# Patient Record
Sex: FEMALE | Race: WHITE | Employment: UNEMPLOYED | URBAN - METROPOLITAN AREA
[De-identification: names, ages, dates, MRNs, and addresses within clinical notes are randomized per-mention and may not be internally consistent; named-entity substitution may affect disease eponyms.]

---

## 2017-10-06 ENCOUNTER — APPOINTMENT (OUTPATIENT)
Dept: GENERAL RADIOLOGY | Age: 52
End: 2017-10-06
Attending: PHYSICIAN ASSISTANT
Payer: COMMERCIAL

## 2017-10-06 ENCOUNTER — HOSPITAL ENCOUNTER (EMERGENCY)
Age: 52
Discharge: HOME | End: 2017-10-06
Attending: EMERGENCY MEDICINE
Payer: COMMERCIAL

## 2017-10-06 VITALS
WEIGHT: 233 LBS | BODY MASS INDEX: 38.82 KG/M2 | RESPIRATION RATE: 16 BRPM | TEMPERATURE: 98.6 F | HEIGHT: 65 IN | OXYGEN SATURATION: 98 % | SYSTOLIC BLOOD PRESSURE: 153 MMHG | DIASTOLIC BLOOD PRESSURE: 81 MMHG | HEART RATE: 62 BPM

## 2017-10-06 DIAGNOSIS — M25.562 ACUTE PAIN OF LEFT KNEE: Primary | ICD-10-CM

## 2017-10-06 PROCEDURE — 73562 X-RAY EXAM OF KNEE 3: CPT

## 2017-10-06 PROCEDURE — 99282 EMERGENCY DEPT VISIT SF MDM: CPT

## 2017-10-06 RX ORDER — CYCLOBENZAPRINE HCL 10 MG
10 TABLET ORAL
Qty: 15 TAB | Refills: 0 | Status: SHIPPED | OUTPATIENT
Start: 2017-10-06 | End: 2022-05-23

## 2017-10-06 NOTE — Clinical Note
Continue to use compression therapy with ambulating. Follow-up with regular doctor. You can apply icy-hot or capsacian cream to the area per package instructions. Return for any new or worsening.  Trudy Josue [place]

## 2017-10-06 NOTE — ED NOTES
Pt reports injuring her knee and being seen in the ER on labor day. Went to [facility] 2 [facility] later, received steroid injection and rx for steroids. Unable to follow up with ortho d/t insurance. Taking Aleve without relief.

## 2017-10-06 NOTE — ED TRIAGE NOTES
Patient reports on labor day she fell injuring her left knee. She went to [facility] 3 weeks ago and had injection of cortisone. Patient states pain never got better and know radiates to lower left leg. Swelling comes and goes.

## 2017-10-07 NOTE — DISCHARGE INSTRUCTIONS
We hope that we have addressed all of your medical concerns. The examination and treatment you received in the Emergency Department were for an emergent problem and were not intended as complete care. It is important that you follow up with your healthcare provider(s) for ongoing care. If your symptoms worsen or do not improve as expected, and you are unable to reach your usual health care provider(s), you should return to the Emergency Department. Today's healthcare is undergoing tremendous change, and patient satisfaction surveys are one of the many tools to assess the quality of medical care. You may receive a survey from the [employer] regarding your experience in the Emergency Department. I hope that your experience has been completely positive, particularly the medical care that I provided. As such, please participate in the survey; anything less than excellent does not meet my expectations or intentions. [facility] and [address] [place] [address] participate in nationally recognized quality of care measures. If your blood pressure is greater than 120/80, as reported below, we urge that you seek medical care to address the potential of high blood pressure, commonly known as hypertension. Hypertension can be hereditary or can be caused by certain medical conditions, pain, stress, or \"white coat syndrome. \"       Please make an appointment with your health care provider(s) for follow up of your Emergency Department visit. VITALS:   Patient Vitals for the past 8 hrs:   Temp Pulse Resp SpO2   10/06/17 1854 98.1 °F (36.7 °C) 69 18 98 %          Thank you for allowing us to provide you with medical care today. We realize that you have many choices for your emergency care needs. Please choose us in the future for any continued health care needs. Regards,           April ROSANA.  Juliano, [address] [place] [address].   Office: [phone]            No results found for this or any previous visit (from the past 24 hour(s)). Xr Knee Lt 3 V    Result Date: 10/6/2017  EXAM:  XR KNEE LT 3 V INDICATION:   knee pain. COMPARISON: None. FINDINGS: Three views of the left knee demonstrate no fracture or other acute osseous or articular abnormality. There is no effusion. IMPRESSION:  No acute abnormality. Knee Pain or Injury: Care Instructions  Your Care Instructions    Injuries are a common cause of knee problems. Sudden (acute) injuries may be caused by a direct blow to the knee. They can also be caused by abnormal twisting, bending, or falling on the knee. Pain, bruising, or swelling may be severe, and may start within minutes of the injury. Overuse is another cause of knee pain. Other causes are climbing stairs, kneeling, and other activities that use the knee. Everyday wear and tear, especially as you get older, also can cause knee pain. Rest, along with home treatment, often relieves pain and allows your knee to heal. If you have a serious knee injury, you may need tests and treatment. Follow-up care is a key part of your treatment and safety. Be sure to make and go to all appointments, and call your doctor if you are having problems. It's also a good idea to know your test results and keep a list of the medicines you take. How can you care for yourself at home? · Be safe with medicines. Read and follow all instructions on the label. ¨ If the doctor gave you a prescription medicine for pain, take it as prescribed. ¨ If you are not taking a prescription pain medicine, ask your doctor if you can take an over-the-counter medicine. · Rest and protect your knee. Take a break from any activity that may cause pain. · Put ice or a cold pack on your knee for 10 to 20 minutes at a time. Put a thin cloth between the ice and your skin.   · Prop up a sore knee on a pillow when you ice it or anytime you sit or lie down for the next 3 days. Try to keep it above the level of your heart. This will help reduce swelling. · If your knee is not swollen, you can put moist heat, a heating pad, or a warm cloth on your knee. · If your doctor recommends an elastic bandage, sleeve, or other type of support for your knee, wear it as directed. · Follow your doctor's instructions about how much weight you can put on your leg. Use a cane, crutches, or a walker as instructed. · Follow your doctor's instructions about activity during your healing process. If you can do mild exercise, slowly increase your activity. · Reach and stay at a healthy weight. Extra weight can strain the joints, especially the knees and hips, and make the pain worse. Losing even a few pounds may help. When should you call for help? Call 911 anytime you think you may need emergency care. For example, call if:  · You have symptoms of a blood clot in your lung (called a pulmonary embolism). These may include:  ¨ Sudden chest pain. ¨ Trouble breathing. ¨ Coughing up blood. Call your doctor now or seek immediate medical care if:  · You have severe or increasing pain. · Your leg or foot turns cold or changes color. · You cannot stand or put weight on your knee. · Your knee looks twisted or bent out of shape. · You cannot move your knee. · You have signs of infection, such as:  ¨ Increased pain, swelling, warmth, or redness. ¨ Red streaks leading from the knee. ¨ Pus draining from a place on your knee. ¨ A fever. · You have signs of a blood clot in your leg (called a deep vein thrombosis), such as:  ¨ Pain in your calf, back of the knee, thigh, or groin. ¨ Redness and swelling in your leg or groin. Watch closely for changes in your health, and be sure to contact your doctor if:  · You have tingling, weakness, or numbness in your knee. · You have any new symptoms, such as swelling. · You have bruises from a knee injury that last longer than 2 weeks.   · You do not get better as expected. Where can you learn more? Go to http://casey-claudy.info/. Enter K195 in the search box to learn more about \"Knee Pain or Injury: Care Instructions. \"  Current as of: March 20, 2017  Content Version: 11.3  © 3690-9077 [employer], [employer] Care instructions adapted under license by [employer] (which disclaims liability or warranty for this information). If you have questions about a medical condition or this instruction, always ask your healthcare professional. [address] any warranty or liability for your use of this information.

## 2017-10-07 NOTE — ED PROVIDER NOTES
HPI Comments: 78-year-old  female with medical history significant for hypertension presenting to the emergency department with complaint of a cystic history of left anterior knee pain described as aching, with ambulating and improved compression and rest after a fall up the stairs and landing directly onto the patella. She reports being seen at an [facility] ED immediately following the fall. She she states she was diagnosed with arthritis and referred to an orthopedist. She has subsequently followed up with [facility]o on call and received cortisone injections to the lateral knee. She reports minimal improvement of pain following injection. She reports attempting to followup with orthopedist but secondary to financial constraints has been unable to comply. She reports taking diclofenac twice daily for the past week without significant improvement. She reports intolerance to medication and subsequently stopped taking it 2 days ago. She denies any associated numbness or tingling into the lower extremity. Is able to exercise without difficulty. Has also tried icing a heating the knee. 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 Has otherwise felt well without associated fever, chills, headaches, chest pain, shortness of breath no abdominal pain, nausea, vomiting, diarrhea, constipation, dysuria or frequency, urgency or hematuria. Patient is a 45 y.o. female presenting with knee pain. The history is provided by the patient. Knee Pain    Pertinent negatives include no numbness, no back pain and no neck pain. Past Medical History:   Diagnosis Date    Hypertension        Past Surgical History:   Procedure Laterality Date    HX APPENDECTOMY      HX TUBAL LIGATION           No family history on file.     Social History     Social History    Marital status:      Spouse name: N/A    Number of children: N/A    Years of education: N/A     Occupational History  Not on file. Social History Main Topics    Smoking status: Former Smoker    Smokeless tobacco: Not on file    Alcohol use Yes      Comment: Socially    Drug use: Not on file    Sexual activity: Not on file     Other Topics Concern    Not on file     Social History Narrative         ALLERGIES: Erythromycin; Ibuprofen; and Sulfa (sulfonamide antibiotics)    Review of Systems   Constitutional: Negative. Negative for chills, fatigue and fever. HENT: Negative. Negative for congestion, ear pain, facial swelling, rhinorrhea, sneezing and sore throat. Eyes: Negative for pain, discharge and itching. Respiratory: Negative for cough, chest tightness and shortness of breath. Cardiovascular: Negative. Negative for chest pain and leg swelling. Gastrointestinal: Negative. Negative for abdominal distention, abdominal pain, constipation, diarrhea, nausea and vomiting. Genitourinary: Negative for difficulty urinating, frequency and urgency. Musculoskeletal: Positive for arthralgias (left anterior knee). Negative for back pain, joint swelling, neck pain and neck stiffness. Skin: Negative for color change and rash. Neurological: Negative for dizziness, numbness and headaches. Psychiatric/Behavioral: Negative for confusion and decreased concentration. All other systems reviewed and are negative. Vitals:    10/06/17 1854 10/06/17 2025   BP:  153/81   Pulse: 69 62   Resp: 18 16   Temp: 98.1 °F (36.7 °C) 98.6 °F (37 °C)   SpO2: 98% 98%   Weight: 105.7 kg (233 lb)    Height: 5' 5\" (1.651 m)             Physical Exam   Constitutional: She is oriented to person, place, and time. She appears well-developed and well-nourished. No distress. Well appearing  female in NAD   HENT:   Head: Normocephalic and atraumatic. Right Ear: External ear normal.   Left Ear: External ear normal.   Nose: Nose normal.   Mouth/Throat: Oropharynx is clear and moist. No oropharyngeal exudate.    Eyes: Conjunctivae and EOM are normal. Pupils are equal, round, and reactive to light. Right eye exhibits no discharge. Left eye exhibits no discharge. No scleral icterus. Neck: Normal range of motion. Neck supple. Cardiovascular: Normal rate and regular rhythm. Exam reveals no gallop and no friction rub. No murmur heard. Pulmonary/Chest: Effort normal and breath sounds normal. She has no wheezes. She has no rales. Abdominal: Soft. Bowel sounds are normal. She exhibits no distension. There is no tenderness. There is no rebound and no guarding. Musculoskeletal:        Left knee: She exhibits normal range of motion, no swelling, no effusion, no ecchymosis, no deformity, no laceration, no erythema and normal alignment. Tenderness found. Lateral joint line tenderness noted. Neurological: She is alert and oriented to person, place, and time. No cranial nerve deficit. Coordination normal.   Skin: Skin is warm and dry. She is not diaphoretic. Psychiatric: She has a normal mood and affect. Her behavior is normal.   Nursing note and vitals reviewed. MDM  Number of Diagnoses or Management Options  Acute pain of left knee:   Diagnosis management comments: 44 yo  female with complaint of a 6 week hx of left lateral knee pain following direct trauma to the area. Exam is non-traumatic in appearance without significant abnormality appreciated. ? Ligamental or meniscus injury  Plan  Xray left knee. Lee aLm         Amount and/or Complexity of Data Reviewed  Tests in the radiology section of CPT®: ordered and reviewed  Independent visualization of images, tracings, or specimens: yes      ED Course       Procedures    Progress note    Imaging unremarkable. Justin Lam[address] [place]    Pt has compression wrap. Declining stronger analgesia. Prefers to try topical analgesia. Trudy Henao, [address] [place]    Patient's results have been reviewed with them.   Patient and/or family have verbally conveyed their understanding and agreement of the patient's signs, symptoms, diagnosis, treatment and prognosis and additionally agree to follow up as recommended or return to the Emergency Room should their condition change prior to follow-up. Discharge instructions have also been provided to the patient with some educational information regarding their diagnosis as well a list of reasons why they would want to return to the ER prior to their follow-up appointment should their condition change. Trudy WAYNE [facility], [address]    A/P  Left knee pain: Continue to use compression therapy with ambulating. Follow-up with regular doctor. You can apply icy-hot or capsacian cream to the area per package instructions. Return for any new or worsening.  Trudy WAYNE [facility], [address]

## 2018-08-29 ENCOUNTER — HOSPITAL ENCOUNTER (EMERGENCY)
Age: 53
Discharge: HOME | End: 2018-08-29
Attending: EMERGENCY MEDICINE
Payer: COMMERCIAL

## 2018-08-29 VITALS
WEIGHT: 242 LBS | OXYGEN SATURATION: 98 % | SYSTOLIC BLOOD PRESSURE: 146 MMHG | BODY MASS INDEX: 38.89 KG/M2 | RESPIRATION RATE: 16 BRPM | HEART RATE: 73 BPM | DIASTOLIC BLOOD PRESSURE: 89 MMHG | TEMPERATURE: 98.6 F | HEIGHT: 66 IN

## 2018-08-29 DIAGNOSIS — L02.415 ABSCESS OF LEG, RIGHT: Primary | ICD-10-CM

## 2018-08-29 PROCEDURE — 99283 EMERGENCY DEPT VISIT LOW MDM: CPT

## 2018-08-29 PROCEDURE — 74011000250 HC RX REV CODE- 250: Performed by: NURSE PRACTITIONER

## 2018-08-29 PROCEDURE — 74011250636 HC RX REV CODE- 250/636: Performed by: NURSE PRACTITIONER

## 2018-08-29 PROCEDURE — 75810000289 HC I&D ABSCESS SIMP/COMP/MULT

## 2018-08-29 PROCEDURE — 74011250637 HC RX REV CODE- 250/637: Performed by: PHYSICIAN ASSISTANT

## 2018-08-29 RX ORDER — ACETAMINOPHEN 500 MG
1000 TABLET ORAL
Status: COMPLETED | OUTPATIENT
Start: 2018-08-29 | End: 2018-08-29

## 2018-08-29 RX ORDER — LIDOCAINE HYDROCHLORIDE 10 MG/ML
5 INJECTION INFILTRATION; PERINEURAL ONCE
Status: COMPLETED | OUTPATIENT
Start: 2018-08-29 | End: 2018-08-29

## 2018-08-29 RX ORDER — CEPHALEXIN 500 MG/1
500 CAPSULE ORAL 3 TIMES DAILY
Qty: 30 CAP | Refills: 0 | Status: SHIPPED | OUTPATIENT
Start: 2018-08-29 | End: 2018-09-08

## 2018-08-29 RX ORDER — CEPHALEXIN 500 MG/1
500 CAPSULE ORAL
Status: COMPLETED | OUTPATIENT
Start: 2018-08-29 | End: 2018-08-29

## 2018-08-29 RX ADMIN — Medication 2 ML: at 18:46

## 2018-08-29 RX ADMIN — LIDOCAINE HYDROCHLORIDE 5 ML: 10 INJECTION, SOLUTION INFILTRATION; PERINEURAL at 19:32

## 2018-08-29 RX ADMIN — ACETAMINOPHEN 1000 MG: 500 TABLET ORAL at 20:08

## 2018-08-29 RX ADMIN — CEPHALEXIN 500 MG: 500 CAPSULE ORAL at 20:08

## 2018-08-29 NOTE — ED NOTES
 
 
45year old female who presents with complaints of right thigh redness and pain. She believes that she may have been bitten by a spider. Associated symptoms include n/v. Seen at HD and was started on doxycycline.

## 2018-08-29 NOTE — ED TRIAGE NOTES
Pt thinks she was bit by a spider Sunday. Complains of baseball size red area to outter R thigh. Seen at [facility] on Monday, site marked with pen. Complains of pain with touch, n/v, headache. 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

## 2018-08-30 NOTE — ED NOTES
Discharge instructions given to pt by MD and RN . Pt has been given counseling on med use and verbalizes  understanding  . Pt ambulated off unit with no signs of distress.

## 2018-08-30 NOTE — DISCHARGE INSTRUCTIONS

## 2021-09-20 DIAGNOSIS — M25.562 LEFT KNEE PAIN, UNSPECIFIED CHRONICITY: Primary | ICD-10-CM

## 2021-09-21 ENCOUNTER — HOSPITAL ENCOUNTER (OUTPATIENT)
Dept: GENERAL RADIOLOGY | Age: 56
Discharge: HOME | End: 2021-09-21
Payer: COMMERCIAL

## 2021-09-21 ENCOUNTER — OFFICE VISIT (OUTPATIENT)
Dept: ORTHOPEDIC SURGERY | Age: 56
End: 2021-09-21
Payer: COMMERCIAL

## 2021-09-21 VITALS
HEART RATE: 62 BPM | TEMPERATURE: 96.3 F | BODY MASS INDEX: 39.05 KG/M2 | RESPIRATION RATE: 18 BRPM | DIASTOLIC BLOOD PRESSURE: 82 MMHG | WEIGHT: 243 LBS | OXYGEN SATURATION: 98 % | HEIGHT: 66 IN | SYSTOLIC BLOOD PRESSURE: 138 MMHG

## 2021-09-21 DIAGNOSIS — M17.0 BILATERAL PRIMARY OSTEOARTHRITIS OF KNEE: Primary | ICD-10-CM

## 2021-09-21 DIAGNOSIS — M25.562 LEFT KNEE PAIN, UNSPECIFIED CHRONICITY: ICD-10-CM

## 2021-09-21 PROCEDURE — 99203 OFFICE O/P NEW LOW 30 MIN: CPT | Performed by: ORTHOPAEDIC SURGERY

## 2021-09-21 PROCEDURE — 73564 X-RAY EXAM KNEE 4 OR MORE: CPT

## 2021-09-21 RX ORDER — CELECOXIB 200 MG/1
200 CAPSULE ORAL 2 TIMES DAILY
Qty: 180 CAPSULE | Refills: 0 | Status: SHIPPED | OUTPATIENT
Start: 2021-09-21 | End: 2021-09-27 | Stop reason: SDUPTHER

## 2021-09-21 RX ORDER — CARVEDILOL 12.5 MG/1
TABLET ORAL 2 TIMES DAILY WITH MEALS
COMMUNITY

## 2021-09-21 RX ORDER — GUAIFENESIN 100 MG/5ML
81 LIQUID (ML) ORAL DAILY
COMMUNITY

## 2021-09-21 RX ORDER — CELECOXIB 200 MG/1
200 CAPSULE ORAL 2 TIMES DAILY
Qty: 60 CAPSULE | Refills: 0 | Status: SHIPPED | OUTPATIENT
Start: 2021-09-21 | End: 2021-09-21 | Stop reason: ALTCHOICE

## 2021-09-21 NOTE — PROGRESS NOTES
9/21/2021    Chief Complaint: Left knee pain    HPI: This is a 60 y.o. female who complains of left knee pain. Onset was gradual.  The patient has had activity dependent pain for several years. The patient has tried activity modification, physical therapy exercises, injections have helped in the past, but they are now of limited utility. The pain is in the medial knee, it is severe in intensity. The patient feels unstable with the knee, fears falling, and has significant limitation with activities of daily living, recreation, and walks with no device. Past Medical History:   Diagnosis Date    Hypertension        Past Surgical History:   Procedure Laterality Date    HX APPENDECTOMY      HX TUBAL LIGATION         Current Outpatient Medications on File Prior to Visit   Medication Sig Dispense Refill    carvediloL (COREG) 12.5 mg tablet Take  by mouth two (2) times daily (with meals).  aspirin 81 mg chewable tablet Take 81 mg by mouth daily.  clonazePAM (KLONOPIN) 1 mg tablet Take 0.5 mg by mouth daily as needed.  cyclobenzaprine (FLEXERIL) 10 mg tablet Take 1 Tab by mouth three (3) times daily as needed for Muscle Spasm(s). (Patient not taking: Reported on 9/21/2021) 15 Tab 0    amoxicillin (AMOXIL) 875 mg tablet Take 875 mg by mouth two (2) times a day. (Patient not taking: Reported on 9/21/2021)      guaiFENesin-dextromethorphan (MUCINEX DM)  mg per tablet Take 1 Tab by mouth two (2) times a day. (Patient not taking: Reported on 9/21/2021)       No current facility-administered medications on file prior to visit. Allergies   Allergen Reactions    Erythromycin Hives and Itching    Ibuprofen Hives    Sulfa (Sulfonamide Antibiotics) Hives and Itching       History reviewed. No pertinent family history.     Social History     Socioeconomic History    Marital status:      Spouse name: Not on file    Number of children: Not on file    Years of education: Not on file    Highest education level: Not on file   Tobacco Use    Smoking status: Former Smoker    Smokeless tobacco: Never Used   Vaping Use    Vaping Use: Some days   Substance and Sexual Activity    Alcohol use: Yes     Comment: Socially     Social Determinants of Health     Financial Resource Strain:     Difficulty of Paying Living Expenses:    Food Insecurity:     Worried About Running Out of Food in the Last Year:     Ran Out of Food in the Last Year:    Transportation Needs:     Lack of Transportation (Medical):  Lack of Transportation (Non-Medical):    Physical Activity:     Days of Exercise per Week:     Minutes of Exercise per Session:    Stress:     Feeling of Stress :    Social Connections:     Frequency of Communication with Friends and Family:     Frequency of Social Gatherings with Friends and Family:     Attends [employer] Services:     Active Member of Clubs or Organizations:     Attends Club or Organization Meetings:     Marital Status:          Review of Systems:       General: Denies headache, lethargy, fever, weight loss  Ears/Nose/Throat: Denies ear discharge, drainage, nosebleeds, hoarse voice, dental problems  Cardiovascular: Denies chest pain, shortness of breath  Lungs: Denies chest pain, breathing problems, wheezing, pneumonia  Stomach: Denies stomach pain, heartburn, constipation, irritable bowel  Skin: Denies rash, sores, open wounds  Musculoskeletal: Admits to knee pain, no deformity. Genitourinary: Denies dysuria, hematuria, polyuria  Gastrointestinal: Denies constipation, obstipation, diarrhea  Neurological: Denies changes in sight, smell, hearing, taste, seizures. Denies loss of consciousness.   Psychiatric: Denies depression, sleep pattern changes, anxiety, change in personality  Endocrine: Denies mood swings, heat or cold intolerance  Hematologic/Lymphatic: Denies anemia, purpura, petechia  Allergic/Immunologic: Denies swelling of throat, pain or swelling at lymph nodes      Physical Examination:    Visit Vitals  /82   Pulse 62   Temp (!) 96.3 °F (35.7 °C) (Temporal)   Resp 18   Ht 5' 5.5\" (1.664 m)   Wt 243 lb (110.2 kg)   SpO2 98%   BMI 39.82 kg/m²        General: AOX3, no apparent distress  Psychiatric: mood and affect appropriate  Lungs: breathing is symmetric and unlabored bilaterally  Heart: regular rate and rhythm  Abdomen: no guarding  Head: normocephalic, atraumatic  Skin: No significant abnormalities, good turgor  Sensation intact to light touch: L1-S1 dermatomes  Muscular exam: 5/5 strength in all major muscle groups unless noted in specialty exam.    Extremities:      Left upper extremity: Full active and passive range of motion without pain, deformity, no open wound, strength 5/5 in all major muscle groups. Right upper extremity: Full active and passive range of motion without pain, deformity, no open wound, strength 5/5 in all major muscle groups. Right lower extremity: Full active and passive range of motion without pain, deformity, no open wound, strength 5/5 in all major muscle groups. Left lower extremity:  No deformity is noted. Range of motion of the knee is 0-1 10. Ligamentous testing of the knee indicates stability of the the MCL, LCL, PCL, and ACL. Lachman's, anterior and posterior drawer tests are specifically negative. Medial joint line tenderness to palpation is noted. Popliteal area is unremarkable. 1+  for effusion. No patellar crepitus. Patella tracks centrally . Pivot shift is negative. Strength testing is indicative of 5/5 strength at hip flexion, extension, knee flexion and extension, tibialis anterior, EHL, and FHL. Sensation is intact to light touch in the L1-S1 dermatomes. Capillary refill is less than 2 seconds in the toes.     Diagnostics:    Pertinent Diagnostics:  Xrays are available of the bilateral knee, they indicate severe medial compartment osteoarthritis of the knee joints, no significant other findings, no other osseus abnormalities, fractures, or dislocations. Assessment: Osteoarthritis left knee    Plan: This patient and I did discuss the many options in treating knee osteoarthritis. We did discuss that we could continue to seek out nonoperative modalities, such as: NSAIDs, oral and topical analgesics, knee injections, knee braces, physical therapy, stretching, strengthening, and weight loss strategies, activity modification, ambulatory assistive devices. The patient stated their understanding with this, but and would like to proceed with nonsurgical management in the form of weight loss exercises, range of motion exercises, Celebrex, bracing as needed and possible total knee replacement in the future. Procedures: None today      Follow-up will be in 2 weeks for repeat clinical check to see how the Celebrex has treated her. Ms. Tatiana Rico has a reminder for a \"due or due soon\" health maintenance. I have asked that she contact her primary care provider for follow-up on this health maintenance.

## 2021-09-21 NOTE — PROGRESS NOTES
Salvatore Sinclair is a 60 y.o. female  HIPAA verified by two patient identifiers. Health Maintenance Due   Topic    Hepatitis C Screening     DTaP/Tdap/Td series (1 - Tdap)    Cervical cancer screen     Lipid Screen     Colorectal Cancer Screening Combo     Shingrix Vaccine Age 50> (1 of 2)    Breast Cancer Screen Mammogram     Flu Vaccine (1)     Chief Complaint   Patient presents with    Knee Pain     left 2/10     Visit Vitals  /82   Pulse 62   Temp (!) 96.3 °F (35.7 °C) (Temporal)   Resp 18   Ht 5' 5.5\" (1.664 m)   Wt 243 lb (110.2 kg)   LMP 09/10/2013   SpO2 98%   BMI 39.82 kg/m²       Pain Scale: 4/10  Pain Location: Knee (left)   1. Have you been to the ER, urgent care clinic since your last visit? Hospitalized since your last visit? No    2. Have you seen or consulted any other health care providers outside of the [address] [place] [address] since your last visit? Include any pap smears or colon screening.  No

## 2021-09-21 NOTE — LETTER
9/21/2021    Patient: Melissa Zhao   YOB: 1965   Date of Visit: 9/21/2021     Jared Martinez MD  [address] [place], [place] [phone]  Via Fax: [phone]    Dear Jared Martinez MD,      Thank you for referring Ms. Bao Swenson to [facility] for evaluation. My notes for this consultation are attached. If you have questions, please do not hesitate to call me. I look forward to following your patient along with you.       Sincerely,    Diana Dean, DO

## 2021-09-27 DIAGNOSIS — M17.0 BILATERAL PRIMARY OSTEOARTHRITIS OF KNEE: ICD-10-CM

## 2021-09-27 RX ORDER — CELECOXIB 200 MG/1
200 CAPSULE ORAL 2 TIMES DAILY
Qty: 180 CAPSULE | Refills: 0 | Status: SHIPPED | OUTPATIENT
Start: 2021-09-27 | End: 2021-12-16 | Stop reason: SDUPTHER

## 2021-10-05 ENCOUNTER — VIRTUAL VISIT (OUTPATIENT)
Dept: ORTHOPEDIC SURGERY | Age: 56
End: 2021-10-05

## 2021-11-10 ENCOUNTER — OFFICE VISIT (OUTPATIENT)
Dept: ORTHOPEDIC SURGERY | Age: 56
End: 2021-11-10
Payer: COMMERCIAL

## 2021-11-10 VITALS
OXYGEN SATURATION: 98 % | DIASTOLIC BLOOD PRESSURE: 85 MMHG | HEIGHT: 66 IN | TEMPERATURE: 97.6 F | WEIGHT: 243 LBS | BODY MASS INDEX: 39.05 KG/M2 | HEART RATE: 75 BPM | SYSTOLIC BLOOD PRESSURE: 138 MMHG

## 2021-11-10 DIAGNOSIS — M17.0 BILATERAL PRIMARY OSTEOARTHRITIS OF KNEE: Primary | ICD-10-CM

## 2021-11-10 PROCEDURE — 99213 OFFICE O/P EST LOW 20 MIN: CPT | Performed by: ORTHOPAEDIC SURGERY

## 2021-11-10 PROCEDURE — 20610 DRAIN/INJ JOINT/BURSA W/O US: CPT | Performed by: ORTHOPAEDIC SURGERY

## 2021-11-10 RX ORDER — BETAMETHASONE SODIUM PHOSPHATE AND BETAMETHASONE ACETATE 3; 3 MG/ML; MG/ML
6 INJECTION, SUSPENSION INTRA-ARTICULAR; INTRALESIONAL; INTRAMUSCULAR; SOFT TISSUE ONCE
Status: COMPLETED | OUTPATIENT
Start: 2021-11-10 | End: 2021-11-10

## 2021-11-10 RX ADMIN — BETAMETHASONE SODIUM PHOSPHATE AND BETAMETHASONE ACETATE 6 MG: 3; 3 INJECTION, SUSPENSION INTRA-ARTICULAR; INTRALESIONAL; INTRAMUSCULAR; SOFT TISSUE at 08:22

## 2021-11-10 NOTE — LETTER
11/10/2021    Patient: Wilber Cooks   YOB: 1965   Date of Visit: 11/10/2021     Miquel Guzman MD  [address] [address] [address] [address]  Via Fax: [phone]    Dear Miquel Guzman MD,      Thank you for referring Ms. Moody Hou to [facility] for evaluation. My notes for this consultation are attached. If you have questions, please do not hesitate to call me. I look forward to following your patient along with you.       Sincerely,    Anant Greenberg, DO

## 2021-11-10 NOTE — PROGRESS NOTES
11/10/2021      CC: Left knee pain    HPI:      This is a 60y.o. year old female who presents for a follow up visit. The patient was last seen and diagnosed with left knee osteoarthritis. The patient's treatments since the most recent visit have comprised of weight loss, exercises, Celebrex. The patient has had good relief of the chief complaint. She was doing very well until 3 weeks ago when she fell down, she had her medial knee and this has caused a significant pain for her recently. She denies any new mechanical symptoms, however this is a pain that has not allowed her to participate in her weight loss and normal activities as it was before. PMH:  Past Medical History:   Diagnosis Date    Hypertension        PSxHx:  Past Surgical History:   Procedure Laterality Date    HX APPENDECTOMY      HX TUBAL LIGATION         Meds:    Current Outpatient Medications:     celecoxib (CELEBREX) 200 mg capsule, Take 1 Capsule by mouth two (2) times a day., Disp: 180 Capsule, Rfl: 0    carvediloL (COREG) 12.5 mg tablet, Take  by mouth two (2) times daily (with meals). , Disp: , Rfl:     aspirin 81 mg chewable tablet, Take 81 mg by mouth daily. , Disp: , Rfl:     clonazePAM (KLONOPIN) 1 mg tablet, Take 0.5 mg by mouth daily as needed. , Disp: , Rfl:     cyclobenzaprine (FLEXERIL) 10 mg tablet, Take 1 Tab by mouth three (3) times daily as needed for Muscle Spasm(s). (Patient not taking: Reported on 9/21/2021), Disp: 15 Tab, Rfl: 0    amoxicillin (AMOXIL) 875 mg tablet, Take 875 mg by mouth two (2) times a day. (Patient not taking: Reported on 9/21/2021), Disp: , Rfl:     guaiFENesin-dextromethorphan (MUCINEX DM)  mg per tablet, Take 1 Tab by mouth two (2) times a day. (Patient not taking: Reported on 9/21/2021), Disp: , Rfl:   No current facility-administered medications for this visit.     All:  Allergies   Allergen Reactions    Erythromycin Hives and Itching    Ibuprofen Hives    Sulfa (Sulfonamide Antibiotics) Hives and Itching       Social Hx:  Social History     Socioeconomic History    Marital status:    Tobacco Use    Smoking status: Former Smoker    Smokeless tobacco: Never Used   Vaping Use    Vaping Use: Some days   Substance and Sexual Activity    Alcohol use: Yes     Comment: Socially       Family Hx:  No family history on file. Review of Systems:       General: Denies headache, lethargy, fever, weight loss  Ears/Nose/Throat: Denies ear discharge, drainage, nosebleeds, hoarse voice, dental problems  Cardiovascular: Denies chest pain, shortness of breath  Lungs: Denies chest pain, breathing problems, wheezing, pneumonia  Stomach: Denies stomach pain, heartburn, constipation, irritable bowel  Skin: Denies rash, sores, open wounds  Musculoskeletal: Left knee pain  Genitourinary: Denies dysuria, hematuria, polyuria  Gastrointestinal: Denies constipation, obstipation, diarrhea  Neurological: Denies changes in sight, smell, hearing, taste, seizures. Denies loss of consciousness.   Psychiatric: Denies depression, sleep pattern changes, anxiety, change in personality  Endocrine: Denies mood swings, heat or cold intolerance  Hematologic/Lymphatic: Denies anemia, purpura, petechia  Allergic/Immunologic: Denies swelling of throat, pain or swelling at lymph nodes      Physical Examination:    Visit Vitals  /85 (BP 1 Location: Right arm, BP Patient Position: Sitting, BP Cuff Size: Large adult)   Pulse 75   Temp 97.6 °F (36.4 °C) (Tympanic)   Ht 5' 5.5\" (1.664 m)   Wt 243 lb (110.2 kg)   SpO2 98%   BMI 39.82 kg/m²        General: AOX3, no apparent distress  Psychiatric: mood and affect appropriate  Lungs: breathing is symmetric and unlabored bilaterally  Heart: regular rate and rhythm  Abdomen: no guarding  Head: normocephalic, atraumatic  Skin: No significant abnormalities, good turgor  Sensation intact to light touch: C5-T1 dermatomes  Muscular exam: 5/5 strength in all major muscle groups unless noted in specialty exam.    Extremities        Right lower extremity: No gross deformity. No restriction to range of motion of the hip, knee, ankle. Muscle bulk is appropriate without wasting. Sensation is intact to light touch in the L1-S1 dermatomes. Capillary refill is less than 2 seconds in the fingers. Strength testing is 5/5 at the major muscle groups of the hip, knee, ankle. Left lower extremity:  Knee is noted to have a mild effusion. Medial joint line tenderness to palpation with a negative deformity. Patellar crepitus with range of motion is noted. Range of motion is 0 to 100. Sensation is intact to light touch L1-S1 dermatomes. Knee flexion and extension strength is 5/5 with Tibialis anterior, EHL, FHL being 5/5 as well. No other gross deformity or deficit is noted. Diagnostics:    Pertinent Diagnostics: None today    Assessment: Exacerbation of pre-existing arthrosis, left knee  Plan: This patient I went over her treatment options, plan will be to have her proceed with an injection, anti-inflammatories, continued weight loss. The plan will also involve a phone call in approximately 1 week for a repeat clinical check. She stated her understanding and satisfaction. Injection was given today with good success. Date of Procedure: 11/10/2021  PROCEDURE NOTE: Left knee injection of Celestone    Consent was obtained from the patient. The correct site was identified after confirmation with the patient. Following identification and confirmation of the correct site with the patient, the superolateral left knee was prepped in the normal sterile fashion. A local anesthetic of 1% lidocaine without epinephrine was then administered to the local tissues. Following, an injection of a mixture of  6 mg Celestone and 1% lidocaine without epinephrine was administered to the left knee.   The needle was then withdrawn and the injection site dressed with a sterile bandage at the conclusion. The procedure was well tolerated by the patient. No immediate adverse reactions were noted. Post injection instructions were given. Ms. Jose Perkins has a reminder for a \"due or due soon\" health maintenance. I have asked that she contact her primary care provider for follow-up on this health maintenance.

## 2021-11-10 NOTE — PROGRESS NOTES
Identified pt with two pt identifiers (name and ). Reviewed chart in preparation for visit and have obtained necessary documentation. Petrona Jeffery is a 60 y.o. female  Chief Complaint   Patient presents with    Follow-up     LT knee      Visit Vitals  /85 (BP 1 Location: Right arm, BP Patient Position: Sitting, BP Cuff Size: Large adult)   Pulse 75   Temp 97.6 °F (36.4 °C) (Tympanic)   Ht 5' 5.5\" (1.664 m)   Wt 243 lb (110.2 kg)   LMP 09/10/2013   SpO2 98%   BMI 39.82 kg/m²     1. Have you been to the ER, urgent care clinic since your last visit? Hospitalized since your last visit? No    2. Have you seen or consulted any other health care providers outside of the [address] [place] [address] since your last visit? Include any pap smears or colon screening.  No

## 2021-11-19 ENCOUNTER — VIRTUAL VISIT (OUTPATIENT)
Dept: ORTHOPEDIC SURGERY | Age: 56
End: 2021-11-19
Payer: COMMERCIAL

## 2021-11-19 DIAGNOSIS — M17.0 BILATERAL PRIMARY OSTEOARTHRITIS OF KNEE: Primary | ICD-10-CM

## 2021-11-19 PROCEDURE — 99212 OFFICE O/P EST SF 10 MIN: CPT | Performed by: ORTHOPAEDIC SURGERY

## 2021-11-19 NOTE — PROGRESS NOTES
11/19/2021      CC: left knee pain    HPI:      This is a 60y.o. year old female who presents for follow up of their left knee injection. The patient states that they have had very good relief of their pain. The time since injection has been one week. PMH:  Past Medical History:   Diagnosis Date    Hypertension        PSxHx:  Past Surgical History:   Procedure Laterality Date    HX APPENDECTOMY      HX TUBAL LIGATION         Meds:    Current Outpatient Medications:     celecoxib (CELEBREX) 200 mg capsule, Take 1 Capsule by mouth two (2) times a day., Disp: 180 Capsule, Rfl: 0    carvediloL (COREG) 12.5 mg tablet, Take  by mouth two (2) times daily (with meals). , Disp: , Rfl:     aspirin 81 mg chewable tablet, Take 81 mg by mouth daily. , Disp: , Rfl:     cyclobenzaprine (FLEXERIL) 10 mg tablet, Take 1 Tab by mouth three (3) times daily as needed for Muscle Spasm(s). (Patient not taking: Reported on 9/21/2021), Disp: 15 Tab, Rfl: 0    clonazePAM (KLONOPIN) 1 mg tablet, Take 0.5 mg by mouth daily as needed. , Disp: , Rfl:     amoxicillin (AMOXIL) 875 mg tablet, Take 875 mg by mouth two (2) times a day. (Patient not taking: Reported on 9/21/2021), Disp: , Rfl:     guaiFENesin-dextromethorphan (MUCINEX DM)  mg per tablet, Take 1 Tab by mouth two (2) times a day. (Patient not taking: Reported on 9/21/2021), Disp: , Rfl:     All:  Allergies   Allergen Reactions    Erythromycin Hives and Itching    Ibuprofen Hives    Sulfa (Sulfonamide Antibiotics) Hives and Itching       Social Hx:  Social History     Socioeconomic History    Marital status:    Tobacco Use    Smoking status: Former Smoker    Smokeless tobacco: Never Used   Vaping Use    Vaping Use: Some days   Substance and Sexual Activity    Alcohol use: Yes     Comment: Socially       Family Hx:  No family history on file.       Review of Systems:       General: Denies headache, lethargy, fever, weight loss  Ears/Nose/Throat: Denies ear discharge, drainage, nosebleeds, hoarse voice, dental problems  Cardiovascular: Denies chest pain, shortness of breath  Lungs: Denies chest pain, breathing problems, wheezing, pneumonia  Stomach: Denies stomach pain, heartburn, constipation, irritable bowel  Skin: Denies rash, sores, open wounds  Musculoskeletal: left knee pain- resolved   Genitourinary: Denies dysuria, hematuria, polyuria  Gastrointestinal: Denies constipation, obstipation, diarrhea  Neurological: Denies changes in sight, smell, hearing, taste, seizures. Denies loss of consciousness. Psychiatric: Denies depression, sleep pattern changes, anxiety, change in personality  Endocrine: Denies mood swings, heat or cold intolerance  Hematologic/Lymphatic: Denies anemia, purpura, petechia  Allergic/Immunologic: Denies swelling of throat, pain or swelling at lymph nodes      Physical Examination:    There were no vitals taken for this visit. General: AOX3, no apparent distress  Psychiatric: mood and affect appropriate        Diagnostics:    Pertinent Diagnostics: none    Assessment: Status post left knee injection  Plan: This patient and I discussed the normal course for injections, we discussed that pain relief will likely be temporary to some degree, but I cannot predict the longevity of relief. We also discussed the limitations of injections, and that I cannot inject the same area any more often than every three months. We will proceed with continued observation. Follow up prn. Patient was in [place] at the time of consultation. I was in the office while conducting this encounter. Consent:  She and/or her healthcare decision maker is aware that this patient-initiated Telehealth encounter is a billable service, with coverage as determined by her insurance carrier. She is aware that she may receive a bill and has provided verbal consent to proceed: Yes    This virtual visit was conducted telephone encounter only.  -  I affirm this is a Patient Initiated Episode with an Established Patient who has not had a related appointment within my department in the past 7 days or scheduled within the next 24 hours. Note: this encounter is not billable if this call serves to triage the patient into an appointment for the relevant concern. Total Time: minutes: 5-10 minutes. .      Ms. Liliam Hawkins has a reminder for a \"due or due soon\" health maintenance. I have asked that she contact her primary care provider for follow-up on this health maintenance.

## 2021-12-16 DIAGNOSIS — M17.0 BILATERAL PRIMARY OSTEOARTHRITIS OF KNEE: ICD-10-CM

## 2021-12-17 RX ORDER — CELECOXIB 200 MG/1
200 CAPSULE ORAL 2 TIMES DAILY
Qty: 180 CAPSULE | Refills: 0 | Status: SHIPPED | OUTPATIENT
Start: 2021-12-17 | End: 2022-03-30 | Stop reason: ALTCHOICE

## 2022-03-30 ENCOUNTER — OFFICE VISIT (OUTPATIENT)
Dept: ORTHOPEDIC SURGERY | Age: 57
End: 2022-03-30
Payer: COMMERCIAL

## 2022-03-30 VITALS
TEMPERATURE: 97.5 F | DIASTOLIC BLOOD PRESSURE: 85 MMHG | OXYGEN SATURATION: 98 % | BODY MASS INDEX: 38.57 KG/M2 | HEIGHT: 66 IN | SYSTOLIC BLOOD PRESSURE: 134 MMHG | HEART RATE: 70 BPM | WEIGHT: 240 LBS

## 2022-03-30 DIAGNOSIS — M17.0 BILATERAL PRIMARY OSTEOARTHRITIS OF KNEE: Primary | ICD-10-CM

## 2022-03-30 PROCEDURE — 20610 DRAIN/INJ JOINT/BURSA W/O US: CPT | Performed by: ORTHOPAEDIC SURGERY

## 2022-03-30 RX ORDER — BETAMETHASONE SODIUM PHOSPHATE AND BETAMETHASONE ACETATE 3; 3 MG/ML; MG/ML
6 INJECTION, SUSPENSION INTRA-ARTICULAR; INTRALESIONAL; INTRAMUSCULAR; SOFT TISSUE ONCE
Status: COMPLETED | OUTPATIENT
Start: 2022-03-30 | End: 2022-03-30

## 2022-03-30 RX ORDER — DICLOFENAC SODIUM 50 MG/1
50 TABLET, DELAYED RELEASE ORAL 3 TIMES DAILY
Qty: 60 TABLET | Refills: 1 | Status: SHIPPED | OUTPATIENT
Start: 2022-03-30 | End: 2022-04-26 | Stop reason: SDUPTHER

## 2022-03-30 RX ADMIN — BETAMETHASONE SODIUM PHOSPHATE AND BETAMETHASONE ACETATE 6 MG: 3; 3 INJECTION, SUSPENSION INTRA-ARTICULAR; INTRALESIONAL; INTRAMUSCULAR; SOFT TISSUE at 07:55

## 2022-03-30 NOTE — PROGRESS NOTES
Identified pt with two pt identifiers (name and ). Reviewed chart in preparation for visit and have obtained necessary documentation. Daljit Durbin is a 60 y.o. female  Chief Complaint   Patient presents with    Joint Or Tendon Injection     LT knee     Visit Vitals  /85 (BP 1 Location: Right arm, BP Patient Position: Sitting, BP Cuff Size: Large adult)   Pulse 70   Temp 97.5 °F (36.4 °C) (Tympanic)   Ht 5' 5.5\" (1.664 m)   Wt 240 lb (108.9 kg)   LMP 09/10/2013   SpO2 98%   BMI 39.33 kg/m²     1. Have you been to the ER, urgent care clinic since your last visit? Hospitalized since your last visit? No    2. Have you seen or consulted any other health care providers outside of the [address] [place] [address] since your last visit? Include any pap smears or colon screening.  No

## 2022-03-30 NOTE — PROGRESS NOTES
3/30/2022      CC: left knee pain    HPI:      This is a 60y.o. year old female who presents for a follow up visit. The patient was last seen and diagnosed with left knee osteoarthritis. The patient's treatments since the most recent visit have comprised of injections. The patient has had good but temporary relief of the chief complaint. PMH:  Past Medical History:   Diagnosis Date    Hypertension        PSxHx:  Past Surgical History:   Procedure Laterality Date    HX APPENDECTOMY      HX TUBAL LIGATION         Meds:    Current Outpatient Medications:     celecoxib (CELEBREX) 200 mg capsule, Take 1 Capsule by mouth two (2) times a day., Disp: 180 Capsule, Rfl: 0    carvediloL (COREG) 12.5 mg tablet, Take  by mouth two (2) times daily (with meals). , Disp: , Rfl:     aspirin 81 mg chewable tablet, Take 81 mg by mouth daily. , Disp: , Rfl:     cyclobenzaprine (FLEXERIL) 10 mg tablet, Take 1 Tab by mouth three (3) times daily as needed for Muscle Spasm(s). (Patient not taking: Reported on 9/21/2021), Disp: 15 Tab, Rfl: 0    clonazePAM (KLONOPIN) 1 mg tablet, Take 0.5 mg by mouth daily as needed. , Disp: , Rfl:     amoxicillin (AMOXIL) 875 mg tablet, Take 875 mg by mouth two (2) times a day. (Patient not taking: Reported on 9/21/2021), Disp: , Rfl:     guaiFENesin-dextromethorphan (MUCINEX DM)  mg per tablet, Take 1 Tab by mouth two (2) times a day. (Patient not taking: Reported on 9/21/2021), Disp: , Rfl:     All:  Allergies   Allergen Reactions    Erythromycin Hives and Itching    Ibuprofen Hives    Sulfa (Sulfonamide Antibiotics) Hives and Itching       Social Hx:  Social History     Socioeconomic History    Marital status:    Tobacco Use    Smoking status: Former Smoker    Smokeless tobacco: Never Used   Vaping Use    Vaping Use: Some days   Substance and Sexual Activity    Alcohol use: Yes     Comment: Socially       Family Hx:  No family history on file.       Review of Systems:       General: Denies headache, lethargy, fever, weight loss  Ears/Nose/Throat: Denies ear discharge, drainage, nosebleeds, hoarse voice, dental problems  Cardiovascular: Denies chest pain, shortness of breath  Lungs: Denies chest pain, breathing problems, wheezing, pneumonia  Stomach: Denies stomach pain, heartburn, constipation, irritable bowel  Skin: Denies rash, sores, open wounds  Musculoskeletal: Left knee pain  Genitourinary: Denies dysuria, hematuria, polyuria  Gastrointestinal: Denies constipation, obstipation, diarrhea  Neurological: Denies changes in sight, smell, hearing, taste, seizures. Denies loss of consciousness. Psychiatric: Denies depression, sleep pattern changes, anxiety, change in personality  Endocrine: Denies mood swings, heat or cold intolerance  Hematologic/Lymphatic: Denies anemia, purpura, petechia  Allergic/Immunologic: Denies swelling of throat, pain or swelling at lymph nodes      Physical Examination:    There were no vitals taken for this visit. General: AOX3, no apparent distress  Psychiatric: mood and affect appropriate  Lungs: breathing is symmetric and unlabored bilaterally  Heart: regular rate and rhythm  Abdomen: no guarding  Head: normocephalic, atraumatic  Skin: No significant abnormalities, good turgor  Sensation intact to light touch: C5-T1 dermatomes  Muscular exam: 5/5 strength in all major muscle groups unless noted in specialty exam.    Extremities        Left lower extremity:  Knee is noted to have a mild effusion. Medial joint line tenderness to palpation with a negative deformity. Patellar crepitus with range of motion is noted. Range of motion is 0-100. Sensation is intact to light touch L1-S1 dermatomes. Knee flexion and extension strength is 5/5 with Tibialis anterior, EHL, FHL being 5/5 as well. No other gross deformity or deficit is noted.     Right lower extremity: Extremity is examined, no evidence of gross deformity, no gross motor or sensory deficit. Full active and passive range of motion is noted. Muscle tone and bulk is within normal expected limits. Capillary refill is less than 2 seconds distally. Diagnostics:    Pertinent Diagnostics: None today    Assessment: Osteoarthritis, left knee  Plan: This patient has above-mentioned issue, based on her current progression, she is going to likely require further intervention in the future, however we discussed her options and she will proceed with an injection for now, she will continue to work on weight loss and activities to her tolerance and follow-up with me on an as-needed basis should any questions or concerns arise. Change to diclofenac from celebrex. Ms. Ivy Sanchez has a reminder for a \"due or due soon\" health maintenance. I have asked that she contact her primary care provider for follow-up on this health maintenance. Date of Procedure: 3/30/2022  PROCEDURE NOTE: Left knee injection of Celestone    Consent was obtained from the patient. The correct site was identified after confirmation with the patient. Following identification and confirmation of the correct site with the patient, the superolateral left knee was prepped in the normal sterile fashion. A local anesthetic of 1% lidocaine without epinephrine was then administered to the local tissues. Following, an injection of a mixture of  6 mg Celestone and 1% lidocaine without epinephrine was administered to the left knee. The needle was then withdrawn and the injection site dressed with a sterile bandage at the conclusion. The procedure was well tolerated by the patient. No immediate adverse reactions were noted. Post injection instructions were given.

## 2022-04-21 ENCOUNTER — TELEPHONE (OUTPATIENT)
Dept: ORTHOPEDIC SURGERY | Age: 57
End: 2022-04-21

## 2022-04-21 DIAGNOSIS — M17.0 BILATERAL PRIMARY OSTEOARTHRITIS OF KNEE: ICD-10-CM

## 2022-04-21 RX ORDER — CELECOXIB 200 MG/1
200 CAPSULE ORAL 2 TIMES DAILY
Qty: 180 CAPSULE | Refills: 0 | Status: CANCELLED | OUTPATIENT
Start: 2022-04-21

## 2022-04-21 NOTE — TELEPHONE ENCOUNTER
Patient is requesting refill on Celebrex 200 mg twice daily and to discontinue diclofenac 50 mg.  She would like the prescription sent to [address] [place] [address] Drive at [address] [place] [address] [address]

## 2022-04-26 RX ORDER — DICLOFENAC SODIUM 50 MG/1
50 TABLET, DELAYED RELEASE ORAL 3 TIMES DAILY
Qty: 60 TABLET | Refills: 1 | Status: SHIPPED | OUTPATIENT
Start: 2022-04-26 | End: 2022-06-29 | Stop reason: SDUPTHER

## 2022-04-26 NOTE — TELEPHONE ENCOUNTER
Disregard previous message requesting to be switched to Celebrex. Patient called back. States she was taking Diclofenac only twice a day. Since she has started taking it 3X a day as prescribed it is helping.

## 2022-05-23 ENCOUNTER — OFFICE VISIT (OUTPATIENT)
Dept: URGENT CARE | Age: 57
End: 2022-05-23
Payer: COMMERCIAL

## 2022-05-23 VITALS
DIASTOLIC BLOOD PRESSURE: 81 MMHG | BODY MASS INDEX: 56.23 KG/M2 | OXYGEN SATURATION: 97 % | HEIGHT: 55 IN | WEIGHT: 243 LBS | HEART RATE: 61 BPM | SYSTOLIC BLOOD PRESSURE: 147 MMHG | RESPIRATION RATE: 17 BRPM | TEMPERATURE: 97.9 F

## 2022-05-23 DIAGNOSIS — M79.671 RIGHT FOOT PAIN: Primary | ICD-10-CM

## 2022-05-23 PROCEDURE — S9083 URGENT CARE CENTER GLOBAL: HCPCS | Performed by: FAMILY MEDICINE

## 2022-05-23 NOTE — PATIENT INSTRUCTIONS
Continue diclofenac as needed  Elevated   Ice     Foot Pain: Care Instructions  Your Care Instructions     Foot injuries that cause pain and swelling are fairly common. Almost all sports or home repair projects can cause a misstep that ends up as foot pain. Normal wear and tear, especially as you get older, also can cause foot pain. Most minor foot injuries will heal on their own, and home treatment is usually all you need to do. If you have a severe injury, you may need tests and treatment. Follow-up care is a key part of your treatment and safety. Be sure to make and go to all appointments, and call your doctor if you are having problems. It's also a good idea to know your test results and keep a list of the medicines you take. How can you care for yourself at home? · Take pain medicines exactly as directed. ? If the doctor gave you a prescription medicine for pain, take it as prescribed. ? If you are not taking a prescription pain medicine, ask your doctor if you can take an over-the-counter medicine. · Rest and protect your foot. Take a break from any activity that may cause pain. · Put ice or a cold pack on your foot for 10 to 20 minutes at a time. Put a thin cloth between the ice and your skin. · Prop up the sore foot on a pillow when you ice it or anytime you sit or lie down during the next 3 days. Try to keep it above the level of your heart. This will help reduce swelling. · Your doctor may recommend that you wrap your foot with an elastic bandage. Keep your foot wrapped for as long as your doctor advises. · If your doctor recommends crutches, use them as directed. · Wear roomy footwear. · As soon as pain and swelling end, begin gentle exercises of your foot. Your doctor can tell you which exercises will help. When should you call for help? Call 911 anytime you think you may need emergency care. For example, call if:    · Your foot turns pale, white, blue, or cold.    Call your doctor now or seek immediate medical care if:    · You cannot move or stand on your foot.     · Your foot looks twisted or out of its normal position.     · Your foot is not stable when you step down.     · You have signs of infection, such as:  ? Increased pain, swelling, warmth, or redness. ? Red streaks leading from the sore area. ? Pus draining from a place on your foot. ? A fever.     · Your foot is numb or tingly. Watch closely for changes in your health, and be sure to contact your doctor if:    · You do not get better as expected.     · You have bruises from an injury that last longer than 2 weeks. Where can you learn more? Go to http://www.[employer].com/  Enter D999 in the search box to learn more about \"Foot Pain: Care Instructions. \"  Current as of: July 1, 2021               Content Version: 13.2  © 1692-2833 [employer]. Care instructions adapted under license by [employer] (which disclaims liability or warranty for this information). If you have questions about a medical condition or this instruction, always ask your healthcare professional. [address] any warranty or liability for your use of this information.

## 2022-05-23 NOTE — PROGRESS NOTES
Anahi Jerome is a 60 y.o. female who presents with right foot pain x 3 days. NKI. Takes diclofenac daily. Wrapped foot in ace today which helped. The history is provided by the patient. Past Medical History:   Diagnosis Date    Hypertension         Past Surgical History:   Procedure Laterality Date    HX APPENDECTOMY      HX TUBAL LIGATION           History reviewed. No pertinent family history. Social History     Socioeconomic History    Marital status:      Spouse name: Not on file    Number of children: Not on file    Years of education: Not on file    Highest education level: Not on file   Occupational History    Not on file   Tobacco Use    Smoking status: Former Smoker    Smokeless tobacco: Never Used   Vaping Use    Vaping Use: Some days   Substance and Sexual Activity    Alcohol use: Yes     Comment: Socially    Drug use: Not on file    Sexual activity: Not on file   Other Topics Concern    Not on file   Social History Narrative    Not on file     Social Determinants of Health     Financial Resource Strain:     Difficulty of Paying Living Expenses: Not on file   Food Insecurity:     Worried About [address] in the Last Year: Not on file    [place] of Food in the Last Year: Not on file   Transportation Needs:     Lack of Transportation (Medical): Not on file    Lack of Transportation (Non-Medical):  Not on file   Physical Activity:     Days of Exercise per Week: Not on file    Minutes of Exercise per Session: Not on file   Stress:     Feeling of Stress : Not on file   Social Connections:     Frequency of Communication with Friends and Family: Not on file    Frequency of Social Gatherings with Friends and Family: Not on file    Attends Druze Services: Not on file    Active Member of Clubs or Organizations: Not on file    Attends Club or Organization Meetings: Not on file    Marital Status: Not on file   Intimate Partner Violence:     Fear of Current or Ex-Partner: Not on file    Emotionally Abused: Not on file    Physically Abused: Not on file    Sexually Abused: Not on file   Housing Stability:     Unable to Pay for Housing in the Last Year: Not on file    Number of Places Lived in the Last Year: Not on file    Unstable Housing in the Last Year: Not on file                ALLERGIES: Erythromycin, Ibuprofen, and Sulfa (sulfonamide antibiotics)    Review of Systems   Musculoskeletal: Positive for arthralgias and gait problem. Skin: Negative for wound. Vitals:    05/23/22 1831   BP: (!) 147/81   Pulse: 61   Resp: 17   Temp: 97.9 °F (36.6 °C)   SpO2: 97%   Weight: 243 lb (110.2 kg)   Height: (!) 5.5\" (0.14 m)       Physical Exam  Vitals and nursing note reviewed. Constitutional:       General: She is not in acute distress. Appearance: She is well-developed. She is not diaphoretic. Pulmonary:      Effort: Pulmonary effort is normal.   Musculoskeletal:      Right foot: Decreased range of motion. Swelling, tenderness and bony tenderness present. Normal pulse. Feet:    Neurological:      Mental Status: She is alert. Psychiatric:         Behavior: Behavior normal.         Thought Content: Thought content normal.         Judgment: Judgment normal.         MDM    ICD-10-CM ICD-9-CM   1. Right foot pain  M79.671 729.5       Orders Placed This Encounter    XR FOOT RT MIN 3 V     Standing Status:   Future     Number of Occurrences:   1     Standing Expiration Date:   5/24/2023     Order Specific Question:   Reason for Exam     Answer:   right foot pain      Elevate   Ice  Rest  Comfortable shoes  The patient is to follow up with PCP INI    If signs and symptoms become worse the pt is to go to the ER. XR Results (most recent):  Results from Appointment encounter on 05/23/22    XR FOOT RT MIN 3 V    Narrative  EXAM: XR FOOT RT MIN 3 V    INDICATION: right foot pain. COMPARISON: None.     FINDINGS: Three views of the right foot demonstrate no fracture or other acute  osseous or articular abnormality. The soft tissues are within normal limits. There is a small plantar calcaneal enthesophyte. Impression  No acute abnormality.     Procedures

## 2022-06-29 ENCOUNTER — TELEPHONE (OUTPATIENT)
Dept: ORTHOPEDIC SURGERY | Age: 57
End: 2022-06-29

## 2022-06-29 RX ORDER — DICLOFENAC SODIUM 50 MG/1
50 TABLET, DELAYED RELEASE ORAL 3 TIMES DAILY
Qty: 60 TABLET | Refills: 1 | Status: SHIPPED | OUTPATIENT
Start: 2022-06-29 | End: 2022-07-01 | Stop reason: SDUPTHER

## 2022-06-29 NOTE — TELEPHONE ENCOUNTER
Patient had a question regarding her diclofenac EC 50 mg prescription. She would like to know why she is only receiving 60 tablets instead of 90 as the directions state she is to take one tablet three times daily. Patient is also stating her pharmacy is requesting authorization from the doctor's office before they will fill the prescription early.  The patient can be reached at [phone]

## 2022-07-01 RX ORDER — DICLOFENAC SODIUM 50 MG/1
50 TABLET, DELAYED RELEASE ORAL 3 TIMES DAILY
Qty: 90 TABLET | Refills: 3 | Status: SHIPPED | OUTPATIENT
Start: 2022-07-01

## 2022-11-14 RX ORDER — DICLOFENAC SODIUM 50 MG/1
50 TABLET, DELAYED RELEASE ORAL 3 TIMES DAILY
Qty: 90 TABLET | Refills: 3 | Status: SHIPPED | OUTPATIENT
Start: 2022-11-14

## 2023-02-15 ENCOUNTER — OFFICE VISIT (OUTPATIENT)
Dept: ORTHOPEDIC SURGERY | Age: 58
End: 2023-02-15
Payer: COMMERCIAL

## 2023-02-15 VITALS
TEMPERATURE: 97.2 F | RESPIRATION RATE: 18 BRPM | HEIGHT: 65 IN | HEART RATE: 68 BPM | SYSTOLIC BLOOD PRESSURE: 141 MMHG | OXYGEN SATURATION: 98 % | DIASTOLIC BLOOD PRESSURE: 82 MMHG | BODY MASS INDEX: 40.48 KG/M2 | WEIGHT: 243 LBS

## 2023-02-15 DIAGNOSIS — M65.331 TRIGGER FINGER, RIGHT MIDDLE FINGER: ICD-10-CM

## 2023-02-15 DIAGNOSIS — M17.0 BILATERAL PRIMARY OSTEOARTHRITIS OF KNEE: Primary | ICD-10-CM

## 2023-02-15 RX ORDER — BETAMETHASONE SODIUM PHOSPHATE AND BETAMETHASONE ACETATE 3; 3 MG/ML; MG/ML
6 INJECTION, SUSPENSION INTRA-ARTICULAR; INTRALESIONAL; INTRAMUSCULAR; SOFT TISSUE ONCE
Status: COMPLETED | OUTPATIENT
Start: 2023-02-15 | End: 2023-02-15

## 2023-02-15 RX ADMIN — BETAMETHASONE SODIUM PHOSPHATE AND BETAMETHASONE ACETATE 6 MG: 3; 3 INJECTION, SUSPENSION INTRA-ARTICULAR; INTRALESIONAL; INTRAMUSCULAR; SOFT TISSUE at 07:50

## 2023-02-15 NOTE — PROGRESS NOTES
2/15/2023      CC: left knee pain, right middle finger triggering    HPI:      This is a 59y.o. year old female who presents for a follow up visit. The patient was last seen and diagnosed with left knee osteoarthritis. The patient's treatments since the most recent visit have comprised of injections, diclofenac. The patient has had 1 year relief of the chief complaint. She also now complains of right middle finger pain, with locking, this has happened several times. Right hand dominant      PMH:  Past Medical History:   Diagnosis Date    Hypertension        PSxHx:  Past Surgical History:   Procedure Laterality Date    HX APPENDECTOMY      HX TUBAL LIGATION         Meds:    Current Outpatient Medications:     diclofenac EC (VOLTAREN) 50 mg EC tablet, Take 1 Tablet by mouth three (3) times daily. , Disp: 90 Tablet, Rfl: 3    carvediloL (COREG) 12.5 mg tablet, Take  by mouth two (2) times daily (with meals). , Disp: , Rfl:     aspirin 81 mg chewable tablet, Take 81 mg by mouth daily. , Disp: , Rfl:     clonazePAM (KLONOPIN) 1 mg tablet, Take 0.5 mg by mouth daily as needed. , Disp: , Rfl:     Current Facility-Administered Medications:     betamethasone (CELESTONE) injection 6 mg, 6 mg, Intra artICUlar, ONCE, Jc Lemus, DO    All:  Allergies   Allergen Reactions    Erythromycin Hives and Itching    Ibuprofen Hives    Sulfa (Sulfonamide Antibiotics) Hives and Itching       Social Hx:  Social History     Socioeconomic History    Marital status:    Tobacco Use    Smoking status: Former    Smokeless tobacco: Never   Vaping Use    Vaping Use: Some days   Substance and Sexual Activity    Alcohol use: Yes     Comment: Socially       Family Hx:  History reviewed. No pertinent family history.       Review of Systems:       General: Denies headache, lethargy, fever, weight loss  Ears/Nose/Throat: Denies ear discharge, drainage, nosebleeds, hoarse voice, dental problems  Cardiovascular: Denies chest pain, shortness of breath  Lungs: Denies chest pain, breathing problems, wheezing, pneumonia  Stomach: Denies stomach pain, heartburn, constipation, irritable bowel  Skin: Denies rash, sores, open wounds  Musculoskeletal: left knee pain  Genitourinary: Denies dysuria, hematuria, polyuria  Gastrointestinal: Denies constipation, obstipation, diarrhea  Neurological: Denies changes in sight, smell, hearing, taste, seizures. Denies loss of consciousness. Psychiatric: Denies depression, sleep pattern changes, anxiety, change in personality  Endocrine: Denies mood swings, heat or cold intolerance  Hematologic/Lymphatic: Denies anemia, purpura, petechia  Allergic/Immunologic: Denies swelling of throat, pain or swelling at lymph nodes      Physical Examination:    There were no vitals taken for this visit. General: AOX3, no apparent distress  Psychiatric: mood and affect appropriate  Lungs: breathing is symmetric and unlabored bilaterally  Heart: regular rate and rhythm  Abdomen: no guarding  Head: normocephalic, atraumatic  Skin: No significant abnormalities, good turgor  Sensation intact to light touch: C5-T1 dermatomes  Muscular exam: 5/5 strength in all major muscle groups unless noted in specialty exam.    Extremities      RUE: No gross deformity. No restriction to range of motion of the shoulder, elbow, wrist.  Neck range of motion is full and pain free. Muscle bulk is appropriate without wasting. Sensation is intact to light touch in the C5-T1 dermatomes. Capillary refill is less than 2 seconds in the fingers. Strength testing is 5/5 at the major muscle groups of the shoulder, elbow, and wrist.      Right lower extremity:  No gross deformity. No restriction to range of motion of the hip, knee, ankle. Muscle bulk is appropriate without wasting. Sensation is intact to light touch in the L1-S1 dermatomes. Capillary refill is less than 2 seconds in the fingers.   Strength testing is 5/5 at the major muscle groups of the hip, knee, ankle. Left lower extremity: Knee is noted to have a mild effusion. Medial joint line tenderness to palpation with a negative deformity. Patellar crepitus with range of motion is noted. Range of motion is 0-120. Sensation is intact to light touch L1-S1 dermatomes. Knee flexion and extension strength is 5/5 with Tibialis anterior, EHL, FHL being 5/5 as well. No other gross deformity or deficit is noted. Diagnostics:    Pertinent Diagnostics: none today    Assessment: Left knee Osteoarthritis, trigger finger right middle  Plan: This patient I had a long discussion regarding treatment options. We went over the nonoperative options, continued medications, injections of multiple types, bracing, physical therapy, maintenance of ideal body weight. Patient has elected to proceed with injection left knee, stretches and possible future injections right middle finger trigger. Ms. Rosetta Askew has a reminder for a \"due or due soon\" health maintenance. I have asked that she contact her primary care provider for follow-up on this health maintenance. PROCEDURE NOTE:    After consent was obtained, the left hip was visualized under direct ultrasound guidance, utilizing a Sonquietrevolutionte C1-4 probe with 3-5 Hz variable frequency oriented in a longitudinal orientation. Once I had confirmation of direct visualization of the head neck junction, skin at the anterior lateral hip was prepped with chlorhexidine. Under direct visualization, 1% lidocaine was injected into the subcutaneous tissues as well as into the capsule of the hip. Needle remained in place 6 mg of betamethasone as well as 1% lidocaine were injected into the hip joint itself, there was good filling of the capsule itself as noted by direct visualization. Images were saved to the Sonosite machine local drive. Once this was completed, the needle was extracted, sterile dressing was applied. The patient tolerated well.   Postinjection instructions were given.

## 2023-02-15 NOTE — PROGRESS NOTES
Identified pt with two pt identifiers (name and ). Reviewed chart in preparation for visit and have obtained necessary documentation. Saumya Vivas is a 59 y.o. female  Chief Complaint   Patient presents with    Joint Or Tendon Injection     LT Knee      Visit Vitals  BP (!) 141/82 (BP 1 Location: Right arm, BP Patient Position: Sitting, BP Cuff Size: Large adult)   Pulse 68   Temp 97.2 °F (36.2 °C) (Tympanic)   Resp 18   Ht 5' 5\" (1.651 m)   Wt 243 lb (110.2 kg)   LMP 09/10/2013   SpO2 98%   BMI 40.44 kg/m²     1. Have you been to the ER, urgent care clinic since your last visit? Hospitalized since your last visit? No    2. Have you seen or consulted any other health care providers outside of the [address] [place] [address] since your last visit? Include any pap smears or colon screening.  No

## 2023-03-21 RX ORDER — DICLOFENAC SODIUM 50 MG/1
TABLET, DELAYED RELEASE ORAL
Qty: 90 TABLET | Refills: 3 | Status: SHIPPED | OUTPATIENT
Start: 2023-03-21

## 2023-09-05 NOTE — TELEPHONE ENCOUNTER
Patient is requesting a refill on her prescribed diclofenac 50 mg and would like it sent to her preferred pharmacy of [address] [place] [address], [address] [place] [address] Traffic[address] [phone] - F [phone]    She is scheduled for 9/13/23 and declined to come in sooner due to conflicts at work.

## 2023-09-13 ENCOUNTER — OFFICE VISIT (OUTPATIENT)
Age: 58
End: 2023-09-13

## 2023-09-13 VITALS
TEMPERATURE: 96.3 F | BODY MASS INDEX: 39.99 KG/M2 | RESPIRATION RATE: 15 BRPM | WEIGHT: 240 LBS | OXYGEN SATURATION: 99 % | SYSTOLIC BLOOD PRESSURE: 168 MMHG | DIASTOLIC BLOOD PRESSURE: 74 MMHG | HEIGHT: 65 IN | HEART RATE: 80 BPM

## 2023-09-13 DIAGNOSIS — M17.0 BILATERAL PRIMARY OSTEOARTHRITIS OF KNEE: Primary | ICD-10-CM

## 2023-09-13 RX ORDER — BETAMETHASONE SODIUM PHOSPHATE AND BETAMETHASONE ACETATE 3; 3 MG/ML; MG/ML
6 INJECTION, SUSPENSION INTRA-ARTICULAR; INTRALESIONAL; INTRAMUSCULAR; SOFT TISSUE ONCE
Status: COMPLETED | OUTPATIENT
Start: 2023-09-13 | End: 2023-09-13

## 2023-09-13 RX ADMIN — BETAMETHASONE SODIUM PHOSPHATE AND BETAMETHASONE ACETATE 6 MG: 3; 3 INJECTION, SUSPENSION INTRA-ARTICULAR; INTRALESIONAL; INTRAMUSCULAR; SOFT TISSUE at 07:57

## 2023-09-13 ASSESSMENT — PATIENT HEALTH QUESTIONNAIRE - PHQ9
1. LITTLE INTEREST OR PLEASURE IN DOING THINGS: 0
SUM OF ALL RESPONSES TO PHQ9 QUESTIONS 1 & 2: 0
SUM OF ALL RESPONSES TO PHQ QUESTIONS 1-9: 0
SUM OF ALL RESPONSES TO PHQ QUESTIONS 1-9: 0
2. FEELING DOWN, DEPRESSED OR HOPELESS: 0
SUM OF ALL RESPONSES TO PHQ QUESTIONS 1-9: 0
SUM OF ALL RESPONSES TO PHQ QUESTIONS 1-9: 0

## 2023-09-13 NOTE — PROGRESS NOTES
Date of Procedure: 9/13/2023  PROCEDURE NOTE: Left knee injection of Celestone    Consent was obtained from the patient. The correct site was identified after confirmation with the patient. Following identification and confirmation of the correct site with the patient, the superolateral left knee was prepped in the normal sterile fashion. A local anesthetic of 1% lidocaine without epinephrine was then administered to the local tissues. Following, an injection of a mixture of  6 mg Celestone and 1% lidocaine without epinephrine was administered to the left knee. The needle was then withdrawn and the injection site dressed with a sterile bandage at the conclusion. The procedure was well tolerated by the patient. No immediate adverse reactions were noted. Post injection instructions were given. Diagnosis: Left Knee Osteoarthritis    9/13/2023      CC: left knee pain    HPI:      This is a 59y.o. year old female who presents for a follow up visit. The patient was last seen and diagnosed with left knee osteoarthritis. The patient's treatments since the most recent visit have comprised of injections. The patient has had 6 months relief of the chief complaint. PMH:  Past Medical History:   Diagnosis Date    Hypertension        PSxHx:  Past Surgical History:   Procedure Laterality Date    APPENDECTOMY      TUBAL LIGATION         Meds:    Current Outpatient Medications:     diclofenac (VOLTAREN) 50 MG EC tablet, Take 1 tablet by mouth 2 times daily, Disp: 60 tablet, Rfl: 0    aspirin 81 MG chewable tablet, Take 81 mg by mouth daily, Disp: , Rfl:     carvedilol (COREG) 12.5 MG tablet, Take by mouth 2 times daily (with meals), Disp: , Rfl:     clonazePAM (KLONOPIN) 1 MG tablet, Take 0.5 mg by mouth daily as needed. , Disp: , Rfl:     All:  Allergies   Allergen Reactions    Erythromycin Hives and Itching    Ibuprofen Hives    Sulfa Antibiotics Hives and Itching       Social Hx:  Social History

## 2023-09-28 NOTE — TELEPHONE ENCOUNTER
Patient is requesting a refill on the prescribed diclofenac and she would like there to be refills on the prescription.  Her preferred pharmacy is [facility]/PHARMACY #[id number]- [address] [facility], [address] [place] [address] [phone]

## 2023-10-02 ENCOUNTER — TELEPHONE (OUTPATIENT)
Age: 58
End: 2023-10-02

## 2023-10-02 NOTE — TELEPHONE ENCOUNTER
Patient is joining the gym tonight and would like to know if she has any physical activity restrictions she should be aware of. She is under the care of Dr. Christopher Salazar for bilateral knee pain.  She may be reached at [phone]

## 2023-10-02 NOTE — TELEPHONE ENCOUNTER
Patient is joining the gym tonight and would like to know if she has any physical activity restrictions she should be aware of. She is under the care of Dr. Ally Musa for bilateral knee pain.  She may be reached at [phone]

## 2023-10-23 ENCOUNTER — TELEPHONE (OUTPATIENT)
Age: 58
End: 2023-10-23

## 2023-10-23 RX ORDER — DICLOFENAC SODIUM 75 MG/1
75 TABLET, DELAYED RELEASE ORAL 2 TIMES DAILY
Qty: 60 TABLET | Refills: 0 | Status: SHIPPED | OUTPATIENT
Start: 2023-10-23 | End: 2023-11-22

## 2023-10-23 NOTE — TELEPHONE ENCOUNTER
Patient is requesting to be returned to 3 times daily on the diclofenac or if she may have her MG increased 75 mg. She has been taking it as 3 times daily as was prescribed originally on 11/14/22. She is currently out of her medication and her pharmacy is informing her it is too soon to have it refilled.  Her preferred pharmacy of [address] [place] [address] [id number] [facility], [address] [place] [address] [phone] - F [phone]

## 2023-10-23 NOTE — TELEPHONE ENCOUNTER
Patient is requesting a refill on her prescription diclofenac and would like it written for a 90 day supply with refills.  Her preferred pharmacy is [facility]/PHARMACY #[id number]- [address] [facility], [address] [place] [address] [phone]

## 2023-10-23 NOTE — TELEPHONE ENCOUNTER
LVM for the patient informing her that the PA had called in the new prescription at 75 mg and that she is to take it at most twice daily.

## 2023-11-21 ENCOUNTER — TELEPHONE (OUTPATIENT)
Age: 58
End: 2023-11-21

## 2023-11-21 RX ORDER — DICLOFENAC SODIUM 75 MG/1
75 TABLET, DELAYED RELEASE ORAL 2 TIMES DAILY
Qty: 60 TABLET | Refills: 0 | Status: SHIPPED | OUTPATIENT
Start: 2023-11-21

## 2023-11-21 NOTE — TELEPHONE ENCOUNTER
Patient is requesting a prescription of diclofenac 75 mg with refills and to have it sent to her preferred pharmacy [facility]/PHARMACY #[id number]- [address] [facility], [address] [place] [address] Drive [phone] [[id number]]

## 2023-11-22 DIAGNOSIS — M17.0 BILATERAL PRIMARY OSTEOARTHRITIS OF KNEE: Primary | ICD-10-CM

## 2023-11-22 RX ORDER — DICLOFENAC SODIUM 75 MG/1
75 TABLET, DELAYED RELEASE ORAL 2 TIMES DAILY PRN
Qty: 60 TABLET | Refills: 3 | Status: SHIPPED | OUTPATIENT
Start: 2023-11-22

## 2023-12-08 ENCOUNTER — TELEMEDICINE (OUTPATIENT)
Age: 58
End: 2023-12-08
Payer: COMMERCIAL

## 2023-12-08 DIAGNOSIS — M17.0 BILATERAL PRIMARY OSTEOARTHRITIS OF KNEE: Primary | ICD-10-CM

## 2023-12-08 PROCEDURE — 99213 OFFICE O/P EST LOW 20 MIN: CPT | Performed by: ORTHOPAEDIC SURGERY

## 2023-12-08 RX ORDER — HYALURONATE SODIUM 10 MG/ML
20 SYRINGE (ML) INTRAARTICULAR WEEKLY
Qty: 8.12 ML | Refills: 0 | Status: SHIPPED | OUTPATIENT
Start: 2023-12-08 | End: 2024-01-13

## 2023-12-08 NOTE — PROGRESS NOTES
Abnormal -    HENT:   [] Normocephalic, atraumatic. [] Abnormal   [] Mouth/Throat: Mucous membranes are moist.     External Ears [] Normal  [] Abnormal-     Neck: [] No visualized mass     Pulmonary/Chest: [x] Respiratory effort normal.  [] No visualized signs of difficulty breathing or respiratory distress        [] Abnormal-      Musculoskeletal:   [] Normal gait with no signs of ataxia         [] Normal range of motion of neck        [] Abnormal-       Neurological:        [] No Facial Asymmetry (Cranial nerve 7 motor function) (limited exam to video visit)          [] No gaze palsy        [] Abnormal-         Skin:        [] No significant exanthematous lesions or discoloration noted on facial skin         [] Abnormal-            Psychiatric:       [] Normal Affect [] No Hallucinations        [] Abnormal-     Other pertinent observable physical exam findings-         ASSESSMENT/PLAN:    No follow-ups on file. This patient I had a long discussion regarding her treatment options, due to the failure of cortisone, we have discussed that viscosupplementation may be helpful for her. Plan will be to have her follow-up with me for viscosupplementation injections, I placed the orders today, this is a new prescription medication and follow-up with me will be when available. Blanco Gonzales, was evaluated through a synchronous (real-time) audio-video encounter. The patient (or guardian if applicable) is aware that this is a billable service, which includes applicable co-pays. This Virtual Visit was conducted with patient's (and/or legal guardian's) consent. Patient identification was verified, and a caregiver was present when appropriate.    The patient was located at Home: [address] [place] [id number]  Provider was located at [facility] ([address]): [address] [facility], [address] [address] [facility]          Total time spent on this encounter: Not billed by time medical decision making is

## 2023-12-12 ENCOUNTER — TELEPHONE (OUTPATIENT)
Age: 58
End: 2023-12-12

## 2023-12-12 NOTE — TELEPHONE ENCOUNTER
----- Message from Alberto Richter DO sent at 12/8/2023  2:44 PM EST -----  Please follow-up on the viscosupplementation orders that I placed to [facility] for both knees.

## 2023-12-21 NOTE — TELEPHONE ENCOUNTER
Approved, injections ordered.    Regino- Please schedule and look out for the gel one injections,

## 2024-01-17 ENCOUNTER — OFFICE VISIT (OUTPATIENT)
Age: 59
End: 2024-01-17
Payer: COMMERCIAL

## 2024-01-17 VITALS
HEART RATE: 83 BPM | SYSTOLIC BLOOD PRESSURE: 144 MMHG | DIASTOLIC BLOOD PRESSURE: 73 MMHG | OXYGEN SATURATION: 98 % | TEMPERATURE: 98 F | BODY MASS INDEX: 42.65 KG/M2 | RESPIRATION RATE: 18 BRPM | WEIGHT: 256 LBS | HEIGHT: 65 IN

## 2024-01-17 DIAGNOSIS — M17.12 UNILATERAL PRIMARY OSTEOARTHRITIS, LEFT KNEE: Primary | ICD-10-CM

## 2024-01-17 PROCEDURE — 20610 DRAIN/INJ JOINT/BURSA W/O US: CPT | Performed by: ORTHOPAEDIC SURGERY

## 2024-01-17 PROCEDURE — 99213 OFFICE O/P EST LOW 20 MIN: CPT | Performed by: ORTHOPAEDIC SURGERY

## 2024-01-17 RX ORDER — GABAPENTIN 100 MG/1
100 CAPSULE ORAL
COMMUNITY
Start: 2023-12-03

## 2024-01-17 RX ORDER — HYDROCHLOROTHIAZIDE 25 MG/1
25 TABLET ORAL DAILY
COMMUNITY

## 2024-01-17 RX ORDER — CELECOXIB 200 MG/1
200 CAPSULE ORAL 2 TIMES DAILY PRN
Qty: 60 CAPSULE | Refills: 0 | Status: SHIPPED | OUTPATIENT
Start: 2024-01-17

## 2024-01-17 ASSESSMENT — PATIENT HEALTH QUESTIONNAIRE - PHQ9
2. FEELING DOWN, DEPRESSED OR HOPELESS: 0
SUM OF ALL RESPONSES TO PHQ QUESTIONS 1-9: 0
1. LITTLE INTEREST OR PLEASURE IN DOING THINGS: 0
SUM OF ALL RESPONSES TO PHQ QUESTIONS 1-9: 0
SUM OF ALL RESPONSES TO PHQ9 QUESTIONS 1 & 2: 0
SUM OF ALL RESPONSES TO PHQ QUESTIONS 1-9: 0
SUM OF ALL RESPONSES TO PHQ QUESTIONS 1-9: 0

## 2024-01-17 NOTE — PROGRESS NOTES
Room: 1A  I have reviewed all needed documentation in preparation for visit. Verified patient by name and date of birth  Chief Complaint   Patient presents with    Knee Pain     Left  Gel-one       Vitals:    01/17/24 0747   BP: (!) 144/73   Site: Left Upper Arm   Position: Sitting   Cuff Size: Large Adult   Pulse: 83   Resp: 18   Temp: 98 °F (36.7 °C)   TempSrc: Oral   SpO2: 98%   Weight: 116.1 kg (256 lb)   Height: 1.651 m (5' 5\")       No LMP recorded. Patient is postmenopausal.    Pain Score:   6    Health Maintenance Review: Patient reminded of \"due or due soon\" health maintenance. I have asked the patient to contact his/her primary care provider (PCP) for follow-up on his/her health maintenance.    \"Have you been to the ER, urgent care clinic since your last visit?  Hospitalized since your last visit?\"    NO    “Have you seen or consulted any other health care providers outside of [facility] since your last visit?”    NO      
seconds in the fingers.  Strength testing is 5/5 at the major muscle groups of the hip, knee, ankle.      Left lower extremity: Knee is noted to have a mild effusion.  medial joint line tenderness to palpation with a negative deformity.  Patellar crepitus with range of motion is noted.  Range of motion is 0-120.   Sensation is intact to light touch L1-S1 dermatomes.  Knee flexion and extension strength is 5/5 with Tibialis anterior, EHL, FHL being 5/5 as well.  No other gross deformity or deficit is noted.          Diagnostics:    Pertinent Diagnostics: none today    Assessment: Left knee Osteoarthritis  Plan:    This patient I had a long discussion regarding treatment options.  We went over the nonoperative options, continued medications, injections of multiple types, bracing, physical therapy, maintenance of ideal body weight.  Patient has elected to proceed with gel one injection and change to celebrex, new prescription given.      Ms. Darden has a reminder for a \"due or due soon\" health maintenance. I have asked that she contact her primary care provider for follow-up on this health maintenance.  Date of Procedure: 1/17/2024  PROCEDURE NOTE: Left knee injection of Gel 1    Consent was obtained from the patient. The correct site was identified after confirmation with the patient.  Following identification and confirmation of the correct site with the patient, the superolateral left knee was prepped in the normal sterile fashion.  A local anesthetic of 1% lidocaine without epinephrine was then administered to the local tissues.  Following, an injection of Gel One 30 mg viscosupplementation prefilled syringe was administered to the left knee.  The needle was then withdrawn and the injection site dressed with a sterile bandage at the conclusion.  The procedure was well tolerated by the patient.  No immediate adverse reactions were noted.  Post injection instructions were given.    Diagnosis: Left Knee

## 2024-01-17 NOTE — ADDENDUM NOTE
Addended by: HAILEE MERCEDES on: 1/17/2024 08:43 AM     Modules accepted: Orders, Level of Service

## 2024-01-18 ENCOUNTER — TELEPHONE (OUTPATIENT)
Age: 59
End: 2024-01-18

## 2024-01-18 NOTE — TELEPHONE ENCOUNTER
Patient is requesting a return phone call to get clarification on the instructions for her prescribed Celebrex. She states that she cannot tell from the instructions on the bottle if she is to take the medication once or twice daily is it indicates \"as need for pain.\" The patient may be reached at [phone].

## 2024-01-18 NOTE — TELEPHONE ENCOUNTER
Called patient and verified name and , pt understands and Complies with provider instructions and directions. No further questions at this time.     Ash Sharma, CCT  24  2:59 PM

## 2024-01-25 ENCOUNTER — SCHEDULED TELEPHONE ENCOUNTER (OUTPATIENT)
Age: 59
End: 2024-01-25
Payer: COMMERCIAL

## 2024-01-25 DIAGNOSIS — M17.0 BILATERAL PRIMARY OSTEOARTHRITIS OF KNEE: ICD-10-CM

## 2024-01-25 DIAGNOSIS — M17.12 UNILATERAL PRIMARY OSTEOARTHRITIS, LEFT KNEE: Primary | ICD-10-CM

## 2024-01-25 PROCEDURE — 99441 PR PHYS/QHP TELEPHONE EVALUATION 5-10 MIN: CPT | Performed by: ORTHOPAEDIC SURGERY

## 2024-01-25 NOTE — PROGRESS NOTES
1/25/2024      CC: left knee pain    HPI:      This is a 58 y.o. year old female who presents for follow up of their left knee injection.   The patient states that they have had very good relief of their pain.   The time since injection has been approximately a week.      PMH:  Past Medical History:   Diagnosis Date    Hypertension        PSxHx:  Past Surgical History:   Procedure Laterality Date    APPENDECTOMY      TUBAL LIGATION         Meds:    Current Outpatient Medications:     gabapentin (NEURONTIN) 100 MG capsule, Take 1 capsule by mouth. TAKE 1 TO 2 CAPSULES BY MOUTH EVERY NIGHT AS NEEDED FOR FOOT NUMBNESS, Disp: , Rfl:     hydroCHLOROthiazide (HYDRODIURIL) 25 MG tablet, Take 1 tablet by mouth daily, Disp: , Rfl:     celecoxib (CELEBREX) 200 MG capsule, Take 1 capsule by mouth 2 times daily as needed for Pain, Disp: 60 capsule, Rfl: 0    diclofenac (VOLTAREN) 75 MG EC tablet, Take 1 tablet by mouth 2 times daily as needed for Pain, Disp: 60 tablet, Rfl: 3    diclofenac (VOLTAREN) 75 MG EC tablet, TAKE 1 TABLET BY MOUTH TWICE A DAY, Disp: 60 tablet, Rfl: 0    diclofenac (VOLTAREN) 50 MG EC tablet, Take 1 tablet by mouth 2 times daily, Disp: 60 tablet, Rfl: 0    aspirin 81 MG chewable tablet, Take 1 tablet by mouth daily, Disp: , Rfl:     carvedilol (COREG) 12.5 MG tablet, Take by mouth 2 times daily (with meals), Disp: , Rfl:     clonazePAM (KLONOPIN) 1 MG tablet, Take 0.5 tablets by mouth daily as needed., Disp: , Rfl:     All:  Allergies   Allergen Reactions    Erythromycin Hives and Itching     Can do Z-pack    Ibuprofen Hives     Has taken Ibuprofen in the past without issue. Possible medication interaction    Sulfa Antibiotics Hives and Itching       Social Hx:  Social History     Socioeconomic History    Marital status:    Tobacco Use    Smoking status: Former    Smokeless tobacco: Never   Substance and Sexual Activity    Alcohol use: Yes       Family Hx:  No family history on file.      Review

## 2024-03-04 ENCOUNTER — OFFICE VISIT (OUTPATIENT)
Age: 59
End: 2024-03-04
Payer: COMMERCIAL

## 2024-03-04 VITALS — HEIGHT: 65 IN | BODY MASS INDEX: 42.6 KG/M2

## 2024-03-04 DIAGNOSIS — M17.12 UNILATERAL PRIMARY OSTEOARTHRITIS, LEFT KNEE: ICD-10-CM

## 2024-03-04 DIAGNOSIS — M17.0 BILATERAL PRIMARY OSTEOARTHRITIS OF KNEE: Primary | ICD-10-CM

## 2024-03-04 PROCEDURE — 99213 OFFICE O/P EST LOW 20 MIN: CPT | Performed by: ORTHOPAEDIC SURGERY

## 2024-03-04 PROCEDURE — 20610 DRAIN/INJ JOINT/BURSA W/O US: CPT | Performed by: ORTHOPAEDIC SURGERY

## 2024-03-04 RX ORDER — BETAMETHASONE SODIUM PHOSPHATE AND BETAMETHASONE ACETATE 3; 3 MG/ML; MG/ML
6 INJECTION, SUSPENSION INTRA-ARTICULAR; INTRALESIONAL; INTRAMUSCULAR; SOFT TISSUE ONCE
Status: COMPLETED | OUTPATIENT
Start: 2024-03-04 | End: 2024-03-04

## 2024-03-04 RX ORDER — CELECOXIB 200 MG/1
200 CAPSULE ORAL 2 TIMES DAILY PRN
Qty: 90 CAPSULE | Refills: 3 | Status: SHIPPED | OUTPATIENT
Start: 2024-03-04

## 2024-03-04 RX ADMIN — BETAMETHASONE SODIUM PHOSPHATE AND BETAMETHASONE ACETATE 6 MG: 3; 3 INJECTION, SUSPENSION INTRA-ARTICULAR; INTRALESIONAL; INTRAMUSCULAR; SOFT TISSUE at 15:48

## 2024-03-04 ASSESSMENT — PATIENT HEALTH QUESTIONNAIRE - PHQ9
2. FEELING DOWN, DEPRESSED OR HOPELESS: 0
SUM OF ALL RESPONSES TO PHQ QUESTIONS 1-9: 0
1. LITTLE INTEREST OR PLEASURE IN DOING THINGS: 0
SUM OF ALL RESPONSES TO PHQ QUESTIONS 1-9: 0
SUM OF ALL RESPONSES TO PHQ9 QUESTIONS 1 & 2: 0

## 2024-03-04 NOTE — PROGRESS NOTES
3/4/2024      CC: left knee pain    HPI:      This is a 58 y.o. year old female who presents for a follow up visit.  The patient was last seen and diagnosed with left knee osteoarthritis.   The patient's treatments since the most recent visit have comprised of viscosupplementation.   The patient has had two relief of the chief complaint.        PMH:  Past Medical History:   Diagnosis Date    Hypertension        PSxHx:  Past Surgical History:   Procedure Laterality Date    APPENDECTOMY      TUBAL LIGATION         Meds:    Current Outpatient Medications:     celecoxib (CELEBREX) 200 MG capsule, Take 1 capsule by mouth 2 times daily as needed for Pain, Disp: 90 capsule, Rfl: 3    gabapentin (NEURONTIN) 100 MG capsule, Take 1 capsule by mouth. TAKE 1 TO 2 CAPSULES BY MOUTH EVERY NIGHT AS NEEDED FOR FOOT NUMBNESS, Disp: , Rfl:     hydroCHLOROthiazide (HYDRODIURIL) 25 MG tablet, Take 1 tablet by mouth daily, Disp: , Rfl:     diclofenac (VOLTAREN) 75 MG EC tablet, Take 1 tablet by mouth 2 times daily as needed for Pain, Disp: 60 tablet, Rfl: 3    diclofenac (VOLTAREN) 75 MG EC tablet, TAKE 1 TABLET BY MOUTH TWICE A DAY, Disp: 60 tablet, Rfl: 0    aspirin 81 MG chewable tablet, Take 1 tablet by mouth daily, Disp: , Rfl:     carvedilol (COREG) 12.5 MG tablet, Take by mouth 2 times daily (with meals), Disp: , Rfl:     clonazePAM (KLONOPIN) 1 MG tablet, Take 0.5 tablets by mouth daily as needed., Disp: , Rfl:     diclofenac (VOLTAREN) 50 MG EC tablet, Take 1 tablet by mouth 2 times daily, Disp: 60 tablet, Rfl: 0    All:  Allergies   Allergen Reactions    Erythromycin Hives and Itching     Can do Z-pack    Ibuprofen Hives     Has taken Ibuprofen in the past without issue. Possible medication interaction    Sulfa Antibiotics Hives and Itching       Social Hx:  Social History     Socioeconomic History    Marital status:      Spouse name: None    Number of children: None    Years of education: None    Highest education

## 2024-03-04 NOTE — PROGRESS NOTES
Identified pt with two pt identifiers (name and ). Reviewed chart in preparation for visit and have obtained necessary documentation.    Brooklyn Darden is a 58 y.o. female Knee Pain (Left Knee Pain )  .    Vitals:    24 1526   Height: 1.651 m (5' 5\")          1. Have you been to the ER, urgent care clinic since your last visit?  Hospitalized since your last visit?  no     2. Have you seen or consulted any other health care providers outside of the [facility] since your last visit?  Include any pap smears or colon screening.  no

## 2024-04-30 ENCOUNTER — TELEPHONE (OUTPATIENT)
Age: 59
End: 2024-04-30

## 2024-04-30 RX ORDER — DICLOFENAC SODIUM 75 MG/1
75 TABLET, DELAYED RELEASE ORAL 2 TIMES DAILY
Qty: 60 TABLET | Refills: 0 | Status: SHIPPED | OUTPATIENT
Start: 2024-04-30

## 2024-04-30 NOTE — TELEPHONE ENCOUNTER
Patient called in stating that the cortisone injection in the left knee has worn off and she is currently not having any relief from the pain while using the Celebrex. Patient is requesting a new pain medication ideally, diclofenac. She would like the new prescription to be sent to [facility]/PHARMACY #[id number] [place], [place] - [address] -  [phone] - F [phone] [[id number]]

## 2024-05-27 RX ORDER — DICLOFENAC SODIUM 75 MG/1
75 TABLET, DELAYED RELEASE ORAL 2 TIMES DAILY
Qty: 60 TABLET | Refills: 0 | Status: SHIPPED | OUTPATIENT
Start: 2024-05-27

## 2024-06-05 ENCOUNTER — OFFICE VISIT (OUTPATIENT)
Age: 59
End: 2024-06-05
Payer: COMMERCIAL

## 2024-06-05 VITALS — HEIGHT: 65 IN | BODY MASS INDEX: 42.6 KG/M2

## 2024-06-05 DIAGNOSIS — M17.0 BILATERAL PRIMARY OSTEOARTHRITIS OF KNEE: ICD-10-CM

## 2024-06-05 DIAGNOSIS — M17.12 UNILATERAL PRIMARY OSTEOARTHRITIS, LEFT KNEE: Primary | ICD-10-CM

## 2024-06-05 PROCEDURE — 99213 OFFICE O/P EST LOW 20 MIN: CPT | Performed by: ORTHOPAEDIC SURGERY

## 2024-06-05 PROCEDURE — 20610 DRAIN/INJ JOINT/BURSA W/O US: CPT | Performed by: ORTHOPAEDIC SURGERY

## 2024-06-05 RX ORDER — BETAMETHASONE SODIUM PHOSPHATE AND BETAMETHASONE ACETATE 3; 3 MG/ML; MG/ML
6 INJECTION, SUSPENSION INTRA-ARTICULAR; INTRALESIONAL; INTRAMUSCULAR; SOFT TISSUE ONCE
Status: COMPLETED | OUTPATIENT
Start: 2024-06-05 | End: 2024-06-05

## 2024-06-05 RX ADMIN — BETAMETHASONE SODIUM PHOSPHATE AND BETAMETHASONE ACETATE 6 MG: 3; 3 INJECTION, SUSPENSION INTRA-ARTICULAR; INTRALESIONAL; INTRAMUSCULAR; SOFT TISSUE at 15:30

## 2024-06-05 ASSESSMENT — PATIENT HEALTH QUESTIONNAIRE - PHQ9
2. FEELING DOWN, DEPRESSED OR HOPELESS: NOT AT ALL
SUM OF ALL RESPONSES TO PHQ QUESTIONS 1-9: 0
SUM OF ALL RESPONSES TO PHQ9 QUESTIONS 1 & 2: 0
SUM OF ALL RESPONSES TO PHQ QUESTIONS 1-9: 0
1. LITTLE INTEREST OR PLEASURE IN DOING THINGS: NOT AT ALL

## 2024-06-05 NOTE — PROGRESS NOTES
Identified pt with two pt identifiers (name and ). Reviewed chart in preparation for visit and have obtained necessary documentation.    Brooklyn Darden is a 58 y.o. female Knee Pain (Left knee pain )  .    Vitals:    24 1526   Height: 1.651 m (5' 5\")          1. Have you been to the ER, urgent care clinic since your last visit?  Hospitalized since your last visit?  no     2. Have you seen or consulted any other health care providers outside of the [facility] since your last visit?  Include any pap smears or colon screening.  no  
0-120.   Sensation is intact to light touch L1-S1 dermatomes.  Knee flexion and extension strength is 5/5 with Tibialis anterior, EHL, FHL being 5/5 as well.  No other gross deformity or deficit is noted.          Diagnostics:    Pertinent Diagnostics: none today    Assessment: Left knee Osteoarthritis  Plan:    This patient I had a long discussion regarding treatment options.  We went over the nonoperative options, continued medications, injections of multiple types, bracing, physical therapy, maintenance of ideal body weight.  Patient has elected to proceed with injection, brace given today, possible viscosupplementation in the future, possible knee replacement in the future.      Ms. Darden has a reminder for a \"due or due soon\" health maintenance. I have asked that she contact her primary care provider for follow-up on this health maintenance.  Date of Procedure: 6/5/2024  PROCEDURE NOTE: Left knee injection of Celestone    Consent was obtained from the patient. The correct site was identified after confirmation with the patient.  Following identification and confirmation of the correct site with the patient, the superolateral left knee was prepped in the normal sterile fashion.  A local anesthetic of 1% lidocaine without epinephrine was then administered to the local tissues.  Following, an injection of a mixture of  6 mg Celestone and 1% lidocaine without epinephrine was administered to the left knee.  The needle was then withdrawn and the injection site dressed with a sterile bandage at the conclusion.  The procedure was well tolerated by the patient.  No immediate adverse reactions were noted.  Post injection instructions were given.      Diagnosis: Left Knee Osteoarthritis

## 2024-06-13 RX ORDER — DICLOFENAC SODIUM 75 MG/1
75 TABLET, DELAYED RELEASE ORAL 2 TIMES DAILY
Qty: 60 TABLET | Refills: 0 | Status: SHIPPED | OUTPATIENT
Start: 2024-06-13

## 2024-07-01 ENCOUNTER — SCHEDULED TELEPHONE ENCOUNTER (OUTPATIENT)
Age: 59
End: 2024-07-01

## 2024-07-01 DIAGNOSIS — M17.12 UNILATERAL PRIMARY OSTEOARTHRITIS, LEFT KNEE: Primary | ICD-10-CM

## 2024-07-03 NOTE — PROGRESS NOTES
consent. She has not had a related appointment within my department in the past 7 days or scheduled within the next 24 hours.   The patient was located at Home: [address]  [place] [facility] [address].  The provider was located at Facility (Appt Dept): [address]  [place] [address].    Note: not billable if this call serves to triage the patient into an appointment for the relevant concern  Yes, I confirm.   Brooklyn Darden is a 58 y.o. female evaluated via telephone on 7/1/2024 for Follow-up (F/u cortisone injection )  .        Alberto Richter,           Ms. Darden has a reminder for a \"due or due soon\" health maintenance. I have asked that she contact her primary care provider for follow-up on this health maintenance.

## 2024-07-09 DIAGNOSIS — M25.562 LEFT KNEE PAIN, UNSPECIFIED CHRONICITY: Primary | ICD-10-CM

## 2024-07-10 ENCOUNTER — TELEPHONE (OUTPATIENT)
Age: 59
End: 2024-07-10

## 2024-07-10 NOTE — TELEPHONE ENCOUNTER
Patient called in to check on the status of her PA for her gel injections. Patient was informed that the PA is still pending and when and if it is approved that she will receive a phone call from her specialist pharmacy to confirm that she would like to have the gel injections delivered to our office. Patient will be called once the delivery of the injections has been received in office to schedule the series.

## 2024-08-21 ENCOUNTER — TELEPHONE (OUTPATIENT)
Age: 59
End: 2024-08-21

## 2024-08-21 RX ORDER — DICLOFENAC SODIUM 75 MG/1
75 TABLET, DELAYED RELEASE ORAL 2 TIMES DAILY
Qty: 60 TABLET | Refills: 0 | Status: SHIPPED | OUTPATIENT
Start: 2024-08-21

## 2024-08-21 NOTE — TELEPHONE ENCOUNTER
Patient requesting refill of Diclofenac 75 mg EC tablets to be sent to her preferred pharmacy [facility]/pharmacy #[id number] - [place], [place] - [address] - P [phone] - F [phone]  [address] [place] [address]  Phone: [phone]  Fax: [phone]   Patient's call back number is [phone].

## 2024-08-21 NOTE — TELEPHONE ENCOUNTER
Patient notified of new prescription sent in to her pharmacy for Diclofenac, per her request. Patient voiced understanding of order. Patient's identity verified by Name and

## 2024-09-09 RX ORDER — DICLOFENAC SODIUM 75 MG/1
75 TABLET, DELAYED RELEASE ORAL 2 TIMES DAILY
Qty: 60 TABLET | Refills: 0 | Status: SHIPPED | OUTPATIENT
Start: 2024-09-09

## 2024-09-13 ENCOUNTER — OFFICE VISIT (OUTPATIENT)
Age: 59
End: 2024-09-13

## 2024-09-13 DIAGNOSIS — M17.0 BILATERAL PRIMARY OSTEOARTHRITIS OF KNEE: Primary | ICD-10-CM

## 2024-09-13 RX ORDER — BETAMETHASONE SODIUM PHOSPHATE AND BETAMETHASONE ACETATE 3; 3 MG/ML; MG/ML
6 INJECTION, SUSPENSION INTRA-ARTICULAR; INTRALESIONAL; INTRAMUSCULAR; SOFT TISSUE ONCE
Status: COMPLETED | OUTPATIENT
Start: 2024-09-13 | End: 2024-09-13

## 2024-09-13 RX ADMIN — BETAMETHASONE SODIUM PHOSPHATE AND BETAMETHASONE ACETATE 6 MG: 3; 3 INJECTION, SUSPENSION INTRA-ARTICULAR; INTRALESIONAL; INTRAMUSCULAR; SOFT TISSUE at 07:13

## 2024-09-13 ASSESSMENT — PATIENT HEALTH QUESTIONNAIRE - PHQ9
SUM OF ALL RESPONSES TO PHQ QUESTIONS 1-9: 0
1. LITTLE INTEREST OR PLEASURE IN DOING THINGS: NOT AT ALL
SUM OF ALL RESPONSES TO PHQ9 QUESTIONS 1 & 2: 0
2. FEELING DOWN, DEPRESSED OR HOPELESS: NOT AT ALL
SUM OF ALL RESPONSES TO PHQ QUESTIONS 1-9: 0

## 2024-10-21 ENCOUNTER — TELEPHONE (OUTPATIENT)
Age: 59
End: 2024-10-21

## 2024-10-21 RX ORDER — DICLOFENAC SODIUM 75 MG/1
75 TABLET, DELAYED RELEASE ORAL 2 TIMES DAILY
Qty: 60 TABLET | Refills: 0 | Status: SHIPPED | OUTPATIENT
Start: 2024-10-21

## 2024-10-21 NOTE — TELEPHONE ENCOUNTER
Patient is requesting a refill on diclofenac and to have it sent to her preferred pharmacy of [facility]/PHARMACY #[id number] - [place], [place] - [address] - P [phone] - F [phone] [[id number]]

## 2024-11-25 RX ORDER — DICLOFENAC SODIUM 75 MG/1
75 TABLET, DELAYED RELEASE ORAL 2 TIMES DAILY
Qty: 60 TABLET | Refills: 0 | Status: SHIPPED | OUTPATIENT
Start: 2024-11-25

## 2024-12-16 ENCOUNTER — OFFICE VISIT (OUTPATIENT)
Age: 59
End: 2024-12-16
Payer: COMMERCIAL

## 2024-12-16 VITALS
DIASTOLIC BLOOD PRESSURE: 84 MMHG | HEIGHT: 65 IN | SYSTOLIC BLOOD PRESSURE: 134 MMHG | BODY MASS INDEX: 42.6 KG/M2 | OXYGEN SATURATION: 97 % | HEART RATE: 71 BPM

## 2024-12-16 DIAGNOSIS — M17.12 UNILATERAL PRIMARY OSTEOARTHRITIS, LEFT KNEE: Primary | ICD-10-CM

## 2024-12-16 PROCEDURE — 20610 DRAIN/INJ JOINT/BURSA W/O US: CPT | Performed by: ORTHOPAEDIC SURGERY

## 2024-12-16 PROCEDURE — 99213 OFFICE O/P EST LOW 20 MIN: CPT | Performed by: ORTHOPAEDIC SURGERY

## 2024-12-16 RX ORDER — BETAMETHASONE SODIUM PHOSPHATE AND BETAMETHASONE ACETATE 3; 3 MG/ML; MG/ML
6 INJECTION, SUSPENSION INTRA-ARTICULAR; INTRALESIONAL; INTRAMUSCULAR; SOFT TISSUE ONCE
Status: COMPLETED | OUTPATIENT
Start: 2024-12-16 | End: 2024-12-16

## 2024-12-16 RX ADMIN — BETAMETHASONE SODIUM PHOSPHATE AND BETAMETHASONE ACETATE 6 MG: 3; 3 INJECTION, SUSPENSION INTRA-ARTICULAR; INTRALESIONAL; INTRAMUSCULAR; SOFT TISSUE at 14:23

## 2024-12-16 ASSESSMENT — PATIENT HEALTH QUESTIONNAIRE - PHQ9
SUM OF ALL RESPONSES TO PHQ9 QUESTIONS 1 & 2: 0
1. LITTLE INTEREST OR PLEASURE IN DOING THINGS: NOT AT ALL
SUM OF ALL RESPONSES TO PHQ QUESTIONS 1-9: 0
SUM OF ALL RESPONSES TO PHQ QUESTIONS 1-9: 0
2. FEELING DOWN, DEPRESSED OR HOPELESS: NOT AT ALL
SUM OF ALL RESPONSES TO PHQ QUESTIONS 1-9: 0
SUM OF ALL RESPONSES TO PHQ QUESTIONS 1-9: 0

## 2024-12-16 NOTE — PROGRESS NOTES
Identified pt with two pt identifiers (name and ). Reviewed chart in preparation for visit and have obtained necessary documentation.    Brooklyn Darden is a 59 y.o. female Knee Pain (Left knee )  .    Vitals:    24 1037   BP: 134/84   Site: Left Upper Arm   Position: Sitting   Cuff Size: Large Adult   Pulse: 71   SpO2: 97%   Height: 1.651 m (5' 5\")          1. Have you been to the ER, urgent care clinic since your last visit?  Hospitalized since your last visit?  no     2. Have you seen or consulted any other health care providers outside of the [facility] since your last visit?  Include any pap smears or colon screening.  no  
noted.  Range of motion is 0-1 25.   Sensation is intact to light touch L1-S1 dermatomes.  Knee flexion and extension strength is 5/5 with Tibialis anterior, EHL, FHL being 5/5 as well.  No other gross deformity or deficit is noted.          Diagnostics:    Pertinent Diagnostics: none today    Assessment: Left knee Osteoarthritis  Plan:    This patient I had a long discussion regarding treatment options.  We went over the nonoperative options, continued medications, injections of multiple types, bracing, physical therapy, maintenance of ideal body weight.  Patient has elected to proceed with injection      Ms. Darden has a reminder for a \"due or due soon\" health maintenance. I have asked that she contact her primary care provider for follow-up on this health maintenance.  Date of Procedure: 12/16/2024  PROCEDURE NOTE: Left knee injection of Celestone    Consent was obtained from the patient. The correct site was identified after confirmation with the patient.  Following identification and confirmation of the correct site with the patient, the superolateral left knee was prepped in the normal sterile fashion.  A local anesthetic of 1% lidocaine without epinephrine was then administered to the local tissues.  Following, an injection of a mixture of  6 mg Celestone and 1% lidocaine without epinephrine was administered to the left knee.  The needle was then withdrawn and the injection site dressed with a sterile bandage at the conclusion.  The procedure was well tolerated by the patient.  No immediate adverse reactions were noted.  Post injection instructions were given.      Diagnosis: Left Knee Osteoarthritis

## 2024-12-26 ENCOUNTER — TELEPHONE (OUTPATIENT)
Age: 59
End: 2024-12-26

## 2024-12-26 DIAGNOSIS — M17.0 BILATERAL PRIMARY OSTEOARTHRITIS OF KNEE: ICD-10-CM

## 2024-12-26 RX ORDER — DICLOFENAC SODIUM 75 MG/1
75 TABLET, DELAYED RELEASE ORAL 2 TIMES DAILY PRN
Qty: 60 TABLET | Refills: 3 | Status: SHIPPED | OUTPATIENT
Start: 2024-12-26

## 2024-12-26 NOTE — TELEPHONE ENCOUNTER
PT is requesting a refill of RX diclofenac (VOLTAREN) 75 MG EC tablet be sent to her preferred pharmacy of [facility]/PHARMACY #[id number] - [place], [place] - [address] -  [phone] - F [phone] [[id number]]. PT can be reached at [phone].

## 2025-03-31 ENCOUNTER — OFFICE VISIT (OUTPATIENT)
Age: 60
End: 2025-03-31
Payer: COMMERCIAL

## 2025-03-31 VITALS — WEIGHT: 244 LBS | BODY MASS INDEX: 40.65 KG/M2 | HEIGHT: 65 IN

## 2025-03-31 DIAGNOSIS — M17.12 UNILATERAL PRIMARY OSTEOARTHRITIS, LEFT KNEE: ICD-10-CM

## 2025-03-31 DIAGNOSIS — M17.0 BILATERAL PRIMARY OSTEOARTHRITIS OF KNEE: Primary | ICD-10-CM

## 2025-03-31 PROCEDURE — 99213 OFFICE O/P EST LOW 20 MIN: CPT | Performed by: ORTHOPAEDIC SURGERY

## 2025-03-31 PROCEDURE — 20610 DRAIN/INJ JOINT/BURSA W/O US: CPT | Performed by: ORTHOPAEDIC SURGERY

## 2025-03-31 RX ORDER — BETAMETHASONE SODIUM PHOSPHATE AND BETAMETHASONE ACETATE 3; 3 MG/ML; MG/ML
6 INJECTION, SUSPENSION INTRA-ARTICULAR; INTRALESIONAL; INTRAMUSCULAR; SOFT TISSUE ONCE
Status: COMPLETED | OUTPATIENT
Start: 2025-03-31 | End: 2025-03-31

## 2025-03-31 RX ADMIN — BETAMETHASONE SODIUM PHOSPHATE AND BETAMETHASONE ACETATE 6 MG: 3; 3 INJECTION, SUSPENSION INTRA-ARTICULAR; INTRALESIONAL; INTRAMUSCULAR; SOFT TISSUE at 14:07

## 2025-03-31 ASSESSMENT — PATIENT HEALTH QUESTIONNAIRE - PHQ9
SUM OF ALL RESPONSES TO PHQ QUESTIONS 1-9: 0
1. LITTLE INTEREST OR PLEASURE IN DOING THINGS: NOT AT ALL
SUM OF ALL RESPONSES TO PHQ QUESTIONS 1-9: 0
SUM OF ALL RESPONSES TO PHQ QUESTIONS 1-9: 0
2. FEELING DOWN, DEPRESSED OR HOPELESS: NOT AT ALL
SUM OF ALL RESPONSES TO PHQ QUESTIONS 1-9: 0

## 2025-03-31 NOTE — PROGRESS NOTES
Identified pt with two pt identifiers (name and ). Reviewed chart in preparation for visit and have obtained necessary documentation.    Brooklyn Darden is a 59 y.o. female  Chief Complaint   Patient presents with    Injections      - FU // LT knee pain // Requesting cortison injection         Ht 1.651 m (5' 5\")   Wt 110.7 kg (244 lb)   BMI 40.60 kg/m²     1. Have you been to the ER, urgent care clinic since your last visit?  Hospitalized since your last visit?yes - see chart     2. Have you seen or consulted any other health care providers outside of the [facility] since your last visit?  Include any pap smears or colon screening. no

## 2025-03-31 NOTE — PROGRESS NOTES
3/31/2025      CC: left knee pain    HPI:      This is a 59 y.o. year old female who presents for a follow up visit.  The patient was last seen and diagnosed with left knee osteoarthritis.   The patient's treatments since the most recent visit have comprised of injections.   The patient has had moderate short term relief of the chief complaint.        PMH:  Past Medical History:   Diagnosis Date    Hypertension        PSxHx:  Past Surgical History:   Procedure Laterality Date    APPENDECTOMY      TUBAL LIGATION         Meds:    Current Outpatient Medications:     diclofenac (VOLTAREN) 75 MG EC tablet, Take 1 tablet by mouth 2 times daily as needed for Pain, Disp: 60 tablet, Rfl: 3    diclofenac (VOLTAREN) 75 MG EC tablet, TAKE 1 TABLET BY MOUTH TWICE A DAY, Disp: 60 tablet, Rfl: 0    gabapentin (NEURONTIN) 100 MG capsule, Take 1 capsule by mouth. TAKE 1 TO 2 CAPSULES BY MOUTH EVERY NIGHT AS NEEDED FOR FOOT NUMBNESS, Disp: , Rfl:     hydroCHLOROthiazide (HYDRODIURIL) 25 MG tablet, Take 1 tablet by mouth daily, Disp: , Rfl:     aspirin 81 MG chewable tablet, Take 1 tablet by mouth daily, Disp: , Rfl:     carvedilol (COREG) 12.5 MG tablet, Take by mouth 2 times daily (with meals), Disp: , Rfl:     clonazePAM (KLONOPIN) 1 MG tablet, Take 0.5 tablets by mouth daily as needed., Disp: , Rfl:     All:  Allergies   Allergen Reactions    Erythromycin Hives and Itching     Can do Z-pack    Ibuprofen Hives     Has taken Ibuprofen in the past without issue. Possible medication interaction    Sulfa Antibiotics Hives and Itching       Social Hx:  Social History     Socioeconomic History    Marital status:      Spouse name: None    Number of children: None    Years of education: None    Highest education level: None   Tobacco Use    Smoking status: Former    Smokeless tobacco: Never   Substance and Sexual Activity    Alcohol use: Yes       Family Hx:  History reviewed. No pertinent family history.      Review of Systems:

## 2025-05-15 DIAGNOSIS — M17.0 BILATERAL PRIMARY OSTEOARTHRITIS OF KNEE: ICD-10-CM

## 2025-05-15 RX ORDER — DICLOFENAC SODIUM 75 MG/1
75 TABLET, DELAYED RELEASE ORAL 2 TIMES DAILY
Qty: 60 TABLET | Refills: 3 | Status: SHIPPED | OUTPATIENT
Start: 2025-05-15

## 2025-07-07 ENCOUNTER — OFFICE VISIT (OUTPATIENT)
Age: 60
End: 2025-07-07
Payer: COMMERCIAL

## 2025-07-07 DIAGNOSIS — M17.0 BILATERAL PRIMARY OSTEOARTHRITIS OF KNEE: Primary | ICD-10-CM

## 2025-07-07 DIAGNOSIS — M17.12 UNILATERAL PRIMARY OSTEOARTHRITIS, LEFT KNEE: ICD-10-CM

## 2025-07-07 PROCEDURE — 20610 DRAIN/INJ JOINT/BURSA W/O US: CPT | Performed by: ORTHOPAEDIC SURGERY

## 2025-07-07 PROCEDURE — 99213 OFFICE O/P EST LOW 20 MIN: CPT | Performed by: ORTHOPAEDIC SURGERY

## 2025-07-07 RX ORDER — BETAMETHASONE SODIUM PHOSPHATE AND BETAMETHASONE ACETATE 3; 3 MG/ML; MG/ML
6 INJECTION, SUSPENSION INTRA-ARTICULAR; INTRALESIONAL; INTRAMUSCULAR; SOFT TISSUE ONCE
Status: COMPLETED | OUTPATIENT
Start: 2025-07-07 | End: 2025-07-07

## 2025-07-07 RX ADMIN — BETAMETHASONE SODIUM PHOSPHATE AND BETAMETHASONE ACETATE 6 MG: 3; 3 INJECTION, SUSPENSION INTRA-ARTICULAR; INTRALESIONAL; INTRAMUSCULAR; SOFT TISSUE at 14:58

## 2025-07-07 ASSESSMENT — PATIENT HEALTH QUESTIONNAIRE - PHQ9
SUM OF ALL RESPONSES TO PHQ QUESTIONS 1-9: 0
SUM OF ALL RESPONSES TO PHQ QUESTIONS 1-9: 0
1. LITTLE INTEREST OR PLEASURE IN DOING THINGS: NOT AT ALL
SUM OF ALL RESPONSES TO PHQ QUESTIONS 1-9: 0
2. FEELING DOWN, DEPRESSED OR HOPELESS: NOT AT ALL
SUM OF ALL RESPONSES TO PHQ QUESTIONS 1-9: 0

## 2025-07-07 NOTE — PROGRESS NOTES
Identified pt with two pt identifiers (name and ). Reviewed chart in preparation for visit and have obtained necessary documentation.    Brooklyn Darden is a 59 y.o. female Follow-up (Left knee pain- requesting injections )        1. Have you been to the ER, urgent care clinic since your last visit?  Hospitalized since your last visit?  no     2. Have you seen or consulted any other health care providers outside of the [facility] since your last visit?  Include any pap smears or colon screening.  no  
discharge, drainage, nosebleeds, hoarse voice, dental problems  Cardiovascular: Denies chest pain, shortness of breath  Lungs: Denies chest pain, breathing problems, wheezing, pneumonia  Stomach: Denies stomach pain, heartburn, constipation, irritable bowel  Skin: Denies rash, sores, open wounds  Musculoskeletal: left knee pain  Genitourinary: Denies dysuria, hematuria, polyuria  Gastrointestinal: Denies constipation, obstipation, diarrhea  Neurological: Denies changes in sight, smell, hearing, taste, seizures. Denies loss of consciousness.  Psychiatric: Denies depression, sleep pattern changes, anxiety, change in personality  Endocrine: Denies mood swings, heat or cold intolerance  Hematologic/Lymphatic: Denies anemia, purpura, petechia  Allergic/Immunologic: Denies swelling of throat, pain or swelling at lymph nodes      Physical Examination:    There were no vitals filed for this visit.     General: AOX3, no apparent distress  Psychiatric: mood and affect appropriate  Lungs: breathing is symmetric and unlabored bilaterally  Heart: regular rate and rhythm  Abdomen: no guarding  Head: normocephalic, atraumatic  Skin: No significant abnormalities, good turgor  Sensation intact to light touch: C5-T1 dermatomes  Muscular exam: 5/5 strength in all major muscle groups unless noted in specialty exam.    Extremities        Right lower extremity:  No gross deformity.  No restriction to range of motion of the hip, knee, ankle.   Muscle bulk is appropriate without wasting.  Sensation is intact to light touch in the L1-S1 dermatomes.  Capillary refill is less than 2 seconds in the fingers.  Strength testing is 5/5 at the major muscle groups of the hip, knee, ankle.      Left lower extremity: Knee is noted to have a mild effusion.  medial joint line tenderness to palpation with a negative deformity.  Patellar crepitus with range of motion is noted.  Range of motion is 0-120.   Sensation is intact to light touch L1-S1